# Patient Record
Sex: FEMALE | Race: WHITE | URBAN - METROPOLITAN AREA
[De-identification: names, ages, dates, MRNs, and addresses within clinical notes are randomized per-mention and may not be internally consistent; named-entity substitution may affect disease eponyms.]

---

## 2024-09-04 ENCOUNTER — APPOINTMENT (RX ONLY)
Dept: URBAN - METROPOLITAN AREA CLINIC 36 | Facility: CLINIC | Age: 59
Setting detail: DERMATOLOGY
End: 2024-09-04

## 2024-09-04 ENCOUNTER — APPOINTMENT (RX ONLY)
Dept: URBAN - METROPOLITAN AREA CLINIC 15 | Facility: CLINIC | Age: 59
Setting detail: DERMATOLOGY
End: 2024-09-04

## 2024-09-04 DIAGNOSIS — Z71.89 OTHER SPECIFIED COUNSELING: ICD-10-CM

## 2024-09-04 DIAGNOSIS — L81.4 OTHER MELANIN HYPERPIGMENTATION: ICD-10-CM

## 2024-09-04 DIAGNOSIS — L82.1 OTHER SEBORRHEIC KERATOSIS: ICD-10-CM

## 2024-09-04 DIAGNOSIS — K64.4 RESIDUAL HEMORRHOIDAL SKIN TAGS: ICD-10-CM

## 2024-09-04 DIAGNOSIS — D18.0 HEMANGIOMA: ICD-10-CM

## 2024-09-04 DIAGNOSIS — D22 MELANOCYTIC NEVI: ICD-10-CM

## 2024-09-04 PROBLEM — D18.01 HEMANGIOMA OF SKIN AND SUBCUTANEOUS TISSUE: Status: ACTIVE | Noted: 2024-09-04

## 2024-09-04 PROBLEM — D22.5 MELANOCYTIC NEVI OF TRUNK: Status: ACTIVE | Noted: 2024-09-04

## 2024-09-04 PROCEDURE — ? COUNSELING

## 2024-09-04 PROCEDURE — ? CONSULTATION FOR COSMETIC REMOVAL

## 2024-09-04 PROCEDURE — ? DIAGNOSIS COMMENT

## 2024-09-04 PROCEDURE — 99213 OFFICE O/P EST LOW 20 MIN: CPT

## 2024-09-04 ASSESSMENT — LOCATION SIMPLE DESCRIPTION DERM
LOCATION SIMPLE: PERIANAL SKIN
LOCATION SIMPLE: RIGHT LOWER BACK
LOCATION SIMPLE: RIGHT LOWER BACK
LOCATION SIMPLE: LEFT UPPER BACK
LOCATION SIMPLE: CHEST
LOCATION SIMPLE: LEFT CHEEK
LOCATION SIMPLE: LEFT CHEEK
LOCATION SIMPLE: LEFT UPPER BACK
LOCATION SIMPLE: CHEST

## 2024-09-04 ASSESSMENT — LOCATION ZONE DERM
LOCATION ZONE: ANUS
LOCATION ZONE: TRUNK
LOCATION ZONE: FACE
LOCATION ZONE: FACE
LOCATION ZONE: TRUNK

## 2024-09-04 ASSESSMENT — LOCATION DETAILED DESCRIPTION DERM
LOCATION DETAILED: LEFT INFERIOR UPPER BACK
LOCATION DETAILED: PERIANAL SKIN
LOCATION DETAILED: LEFT INFERIOR UPPER BACK
LOCATION DETAILED: RIGHT MEDIAL INFERIOR CHEST
LOCATION DETAILED: RIGHT MEDIAL INFERIOR CHEST
LOCATION DETAILED: RIGHT SUPERIOR LATERAL MIDBACK
LOCATION DETAILED: LEFT CENTRAL MALAR CHEEK
LOCATION DETAILED: RIGHT SUPERIOR LATERAL MIDBACK
LOCATION DETAILED: LEFT CENTRAL MALAR CHEEK

## 2024-09-04 NOTE — PROCEDURE: DIAGNOSIS COMMENT
Detail Level: Zone
Comment: Pt describes hx of hemorrhoid that developed after delivery of her child but has since resolved, but has excess skin still in area. Recommended calling her OB/Gyn to see if she performs removal of these and if not, requesting referral for colorectal surgeon consultation to discuss further.
Render Risk Assessment In Note?: no

## 2024-10-10 ENCOUNTER — APPOINTMENT (RX ONLY)
Dept: URBAN - METROPOLITAN AREA CLINIC 20 | Facility: CLINIC | Age: 59
Setting detail: DERMATOLOGY
End: 2024-10-10

## 2024-10-10 DIAGNOSIS — Z41.9 ENCOUNTER FOR PROCEDURE FOR PURPOSES OTHER THAN REMEDYING HEALTH STATE, UNSPECIFIED: ICD-10-CM

## 2024-10-10 PROCEDURE — ? BOTOX

## 2024-10-10 NOTE — PROCEDURE: BOTOX
Additional Area 4 Units: 0
Show Lateral Platysmal Band Units: Yes
Consent: Written consent obtained. Risks include but not limited to lid/brow ptosis, bruising, swelling, diplopia, temporary effect, incomplete chemical denervation.
Show Ucl Units: No
Incrementing Botox Units: By 0.5 Units
Additional Area 3 Location: masseter
Detail Level: Detailed
Post-Care Instructions: Patient instructed to not lie down for 4 hours and limit physical activity for 24 hours. Patient instructed not to travel by airplane for 48 hours.
Additional Area 2 Location: chin
Expiration Date (Month Year): 10/2026
Lot #: [id number]
Additional Area 1 Location: upper lip
Dilution (U/0.1 Cc): 0.1

## 2024-10-24 ENCOUNTER — GYNECOLOGY VISIT (OUTPATIENT)
Dept: GYNECOLOGY | Facility: CLINIC | Age: 59
End: 2024-10-24
Payer: COMMERCIAL

## 2024-10-24 ENCOUNTER — TELEPHONE (OUTPATIENT)
Dept: OBGYN | Facility: CLINIC | Age: 59
End: 2024-10-24

## 2024-10-24 ENCOUNTER — HOSPITAL ENCOUNTER (OUTPATIENT)
Facility: MEDICAL CENTER | Age: 59
End: 2024-10-24
Attending: STUDENT IN AN ORGANIZED HEALTH CARE EDUCATION/TRAINING PROGRAM
Payer: COMMERCIAL

## 2024-10-24 VITALS
DIASTOLIC BLOOD PRESSURE: 79 MMHG | WEIGHT: 143.6 LBS | HEART RATE: 76 BPM | BODY MASS INDEX: 21.83 KG/M2 | SYSTOLIC BLOOD PRESSURE: 120 MMHG

## 2024-10-24 DIAGNOSIS — K64.9 HEMORRHOIDS, UNSPECIFIED HEMORRHOID TYPE: ICD-10-CM

## 2024-10-24 DIAGNOSIS — N95.8 GENITOURINARY SYNDROME OF MENOPAUSE: ICD-10-CM

## 2024-10-24 DIAGNOSIS — Z11.3 ROUTINE SCREENING FOR STI (SEXUALLY TRANSMITTED INFECTION): ICD-10-CM

## 2024-10-24 DIAGNOSIS — Z78.0 POSTMENOPAUSAL: ICD-10-CM

## 2024-10-24 PROCEDURE — 87591 N.GONORRHOEAE DNA AMP PROB: CPT

## 2024-10-24 PROCEDURE — 99214 OFFICE O/P EST MOD 30 MIN: CPT | Performed by: STUDENT IN AN ORGANIZED HEALTH CARE EDUCATION/TRAINING PROGRAM

## 2024-10-24 PROCEDURE — 87491 CHLMYD TRACH DNA AMP PROBE: CPT

## 2024-10-24 PROCEDURE — 3074F SYST BP LT 130 MM HG: CPT | Performed by: STUDENT IN AN ORGANIZED HEALTH CARE EDUCATION/TRAINING PROGRAM

## 2024-10-24 PROCEDURE — 3078F DIAST BP <80 MM HG: CPT | Performed by: STUDENT IN AN ORGANIZED HEALTH CARE EDUCATION/TRAINING PROGRAM

## 2024-10-24 RX ORDER — ESTRADIOL 2 MG/1
RING VAGINAL
Qty: 1 EACH | Refills: 3 | Status: SHIPPED | OUTPATIENT
Start: 2024-10-24

## 2024-10-25 LAB
C TRACH DNA SPEC QL NAA+PROBE: NEGATIVE
N GONORRHOEA DNA SPEC QL NAA+PROBE: NEGATIVE
SPECIMEN SOURCE: NORMAL

## 2024-10-29 ENCOUNTER — APPOINTMENT (RX ONLY)
Dept: URBAN - METROPOLITAN AREA CLINIC 20 | Facility: CLINIC | Age: 59
Setting detail: DERMATOLOGY
End: 2024-10-29

## 2024-10-29 DIAGNOSIS — Z41.9 ENCOUNTER FOR PROCEDURE FOR PURPOSES OTHER THAN REMEDYING HEALTH STATE, UNSPECIFIED: ICD-10-CM

## 2024-10-29 PROCEDURE — ? FRAXEL

## 2024-10-29 ASSESSMENT — LOCATION ZONE DERM
LOCATION ZONE: TRUNK
LOCATION ZONE: NECK
LOCATION ZONE: FACE

## 2024-10-29 ASSESSMENT — LOCATION DETAILED DESCRIPTION DERM
LOCATION DETAILED: RIGHT INFERIOR FOREHEAD
LOCATION DETAILED: LEFT INFERIOR ANTERIOR NECK
LOCATION DETAILED: RIGHT INFERIOR LATERAL FOREHEAD
LOCATION DETAILED: LEFT LATERAL FOREHEAD
LOCATION DETAILED: RIGHT INFERIOR MEDIAL MALAR CHEEK
LOCATION DETAILED: RIGHT MEDIAL SUPERIOR CHEST
LOCATION DETAILED: LEFT MEDIAL SUPERIOR CHEST
LOCATION DETAILED: LEFT SUPERIOR MEDIAL FOREHEAD
LOCATION DETAILED: LEFT INFERIOR CENTRAL MALAR CHEEK

## 2024-10-29 ASSESSMENT — LOCATION SIMPLE DESCRIPTION DERM
LOCATION SIMPLE: RIGHT FOREHEAD
LOCATION SIMPLE: LEFT CHEEK
LOCATION SIMPLE: LEFT FOREHEAD
LOCATION SIMPLE: CHEST
LOCATION SIMPLE: LEFT ANTERIOR NECK
LOCATION SIMPLE: RIGHT CHEEK

## 2024-10-29 NOTE — PROCEDURE: FRAXEL
Treatment Level: 6
Total Coverage: 11%
Treatment Level: 1
Number Of Passes: 4
Wavelength: 1927nm
Energy(Mj/Cm2): 20
Location: decolletage of the chest
Indication: photodamage
Location: neck
Total Coverage: 35%
Was An Eye Shield Used?: No
Medium Metal Eye Shield Text: The ocular mucosa was anesthetized with tetracaine. Once adequate anesthesia was optained, medium metal eye shields were inserted and remained in place until the procedure was completed.
Total Coverage: 25%
Location: dorsal forearms
Small Plastic Eye Shield Text: The ocular mucosa was anesthetized with tetracaine. Once adequate anesthesia was optained, small plastic eye shields were inserted and remained in place until the procedure was completed.
Consent: Written consent obtained, risks reviewed including but not limited to pain and incomplete improvement.
Medium Plastic Eye Shield Text: The ocular mucosa was anesthetized with tetracaine. Once adequate anesthesia was optained, medium plastic eye shields were inserted and remained in place until the procedure was completed.
Energy(Mj/Cm2): 40
Small Metal Eye Shield Text: The ocular mucosa was anesthetized with tetracaine. Once adequate anesthesia was optained, small metal eye shields were inserted and remained in place until the procedure was completed.
External Cooling: Satnam Cryo 5
Large Plastic Eye Shield Text: The ocular mucosa was anesthetized with tetracaine. Once adequate anesthesia was optained, large plastic eye shields were inserted and remained in place until the procedure was completed.
Location: left dorsal hand
Post-Care Instructions: I reviewed with the patient in detail post-care instructions. Patient should avoid sun until area fully healed.
Price (Use Numbers Only, No Special Characters Or $): 630.00
Detail Level: Simple
Tip: 15mm
Large Metal Eye Shield Text: The ocular mucosa was anesthetized with tetracaine. Once adequate anesthesia was optained, large metal eye shields were inserted and remained in place until the procedure was completed.
Location: full face

## 2024-10-31 ENCOUNTER — HOSPITAL ENCOUNTER (OUTPATIENT)
Dept: LAB | Facility: MEDICAL CENTER | Age: 59
End: 2024-10-31
Attending: STUDENT IN AN ORGANIZED HEALTH CARE EDUCATION/TRAINING PROGRAM
Payer: COMMERCIAL

## 2024-10-31 DIAGNOSIS — Z11.3 ROUTINE SCREENING FOR STI (SEXUALLY TRANSMITTED INFECTION): ICD-10-CM

## 2024-10-31 LAB
HCV AB SER QL: NORMAL
HIV 1+2 AB+HIV1 P24 AG SERPL QL IA: NORMAL

## 2024-10-31 PROCEDURE — 36415 COLL VENOUS BLD VENIPUNCTURE: CPT

## 2024-10-31 PROCEDURE — 86803 HEPATITIS C AB TEST: CPT

## 2024-10-31 PROCEDURE — 87389 HIV-1 AG W/HIV-1&-2 AB AG IA: CPT

## 2024-10-31 PROCEDURE — 86780 TREPONEMA PALLIDUM: CPT

## 2024-11-01 LAB — T PALLIDUM AB SER QL IA: NONREACTIVE

## 2024-11-06 ENCOUNTER — APPOINTMENT (RX ONLY)
Dept: URBAN - METROPOLITAN AREA CLINIC 15 | Facility: CLINIC | Age: 59
Setting detail: DERMATOLOGY
End: 2024-11-06

## 2024-11-06 DIAGNOSIS — L91.8 OTHER HYPERTROPHIC DISORDERS OF THE SKIN: ICD-10-CM

## 2024-11-06 DIAGNOSIS — D22 MELANOCYTIC NEVI: ICD-10-CM

## 2024-11-06 PROBLEM — D22.4 MELANOCYTIC NEVI OF SCALP AND NECK: Status: ACTIVE | Noted: 2024-11-06

## 2024-11-06 PROBLEM — D22.5 MELANOCYTIC NEVI OF TRUNK: Status: ACTIVE | Noted: 2024-11-06

## 2024-11-06 PROCEDURE — ? BIOPSY BY SHAVE METHOD (COSMETIC)

## 2024-11-06 PROCEDURE — ? DIAGNOSIS COMMENT

## 2024-11-06 PROCEDURE — ? CONSULTATION FOR COSMETIC REMOVAL

## 2024-11-06 ASSESSMENT — LOCATION SIMPLE DESCRIPTION DERM
LOCATION SIMPLE: TRAPEZIAL NECK
LOCATION SIMPLE: ABDOMEN
LOCATION SIMPLE: LEFT BREAST
LOCATION SIMPLE: LEFT ANTERIOR NECK

## 2024-11-06 ASSESSMENT — LOCATION DETAILED DESCRIPTION DERM
LOCATION DETAILED: LEFT MEDIAL BREAST 8-9:00 REGION
LOCATION DETAILED: LEFT INFERIOR LATERAL NECK
LOCATION DETAILED: MID TRAPEZIAL NECK
LOCATION DETAILED: RIGHT LATERAL ABDOMEN

## 2024-11-06 ASSESSMENT — LOCATION ZONE DERM
LOCATION ZONE: NECK
LOCATION ZONE: TRUNK
LOCATION ZONE: NECK

## 2024-11-06 NOTE — PROCEDURE: BIOPSY BY SHAVE METHOD (COSMETIC)
Detail Level: Simple
Biopsy Type: H and E
Biopsy Method: Dermablade
Anesthesia Type: 1% lidocaine with epinephrine and a 1:10 solution of 8.4% sodium bicarbonate
Anesthesia Volume In Cc: 1
Hemostasis: Electrocautery
Wound Care: Petrolatum
Size Of Lesion In Cm (Optional): 0.5
X Size Of Lesion In Cm (Optional): 0
Price (Use Numbers Only, No Special Characters Or $): 802.67
Lab: [id number]
Lab Facility: 
Path Notes (To The Dermatopathologist): Cosmetic shave removal - cash pay
Render Path Notes In Note?: No
Consent: Written consent was obtained and risks were reviewed including but not limited to scarring, infection, bleeding, scabbing, incomplete removal, nerve damage and allergy to anesthesia.
Post-Care Instructions: I reviewed with the patient in detail post-care instructions. Patient is to keep the biopsy site dry overnight, and then apply Vaseline daily until healed.
Notification Instructions: Patient will be notified of biopsy results. However, patient instructed to call the office if not contacted within 2 weeks.
Billing Type: Patient Bill
Size Of Lesion In Cm (Optional): 0.6

## 2024-11-06 NOTE — PROCEDURE: DIAGNOSIS COMMENT
Detail Level: Zone
Render Risk Assessment In Note?: no
Comment: Snip removal performed as a courtesy today.

## 2024-11-07 RX ORDER — ESTRADIOL 10 UG/1
INSERT VAGINAL
Qty: 24 TABLET | Refills: 0 | Status: SHIPPED | OUTPATIENT
Start: 2024-11-07

## 2024-11-18 ENCOUNTER — APPOINTMENT (RX ONLY)
Dept: URBAN - METROPOLITAN AREA CLINIC 20 | Facility: CLINIC | Age: 59
Setting detail: DERMATOLOGY
End: 2024-11-18

## 2024-11-18 DIAGNOSIS — Z41.9 ENCOUNTER FOR PROCEDURE FOR PURPOSES OTHER THAN REMEDYING HEALTH STATE, UNSPECIFIED: ICD-10-CM

## 2024-11-18 PROCEDURE — ? LASER HAIR REMOVAL

## 2024-11-18 NOTE — PROCEDURE: LASER HAIR REMOVAL
Pulse Duration (Include Units): 03
Were Eye Shields Employed?: No
Laser Type: diode 810nm
Pulse Duration (Include Units): 2 passes
Price (Use Numbers Only, No Special Characters Or $): 158
Total Pulses: 60ms 2 passes
Treatment Number: 0
Treatment Number: 1
Fluence (Will Not Render If 0): 20
Pre-Procedure: Prior to proceeding the treatment areas were cleaned and all present put on their eye protection.
Consent: Written consent obtained, risks reviewed including but not limited to crusting, scabbing, blistering, scarring, darker or lighter pigmentary change, paradoxical hair regrowth, incomplete removal of hair and infection.
Eye Shield Text: Given the treatment area eye shields were inserted prior to treatment.
Shaving (Optional): The patient shaved at home
Number Of Prepaid Treatments (Will Not Render If 0): 4
Detail Level: Zone
Post-Care Instructions: I reviewed with the patient in detail post-care instructions. Patient should avoid sun for a minimum of 4 weeks before and after treatment.
Fluence (Will Not Render If 0): 15
Post-Procedure Care: Immediate endpoint: perifollicular erythema and edema. Post care reviewed with patient.
Tolerated Procedure (Optional): Tolerated Well
Fluence (Will Not Render If 0): 12

## 2024-12-23 ENCOUNTER — APPOINTMENT (OUTPATIENT)
Dept: URBAN - METROPOLITAN AREA CLINIC 20 | Facility: CLINIC | Age: 59
Setting detail: DERMATOLOGY
End: 2024-12-23

## 2024-12-23 DIAGNOSIS — Z41.9 ENCOUNTER FOR PROCEDURE FOR PURPOSES OTHER THAN REMEDYING HEALTH STATE, UNSPECIFIED: ICD-10-CM

## 2024-12-23 PROCEDURE — ? LASER HAIR REMOVAL

## 2025-01-09 DIAGNOSIS — Z12.31 ENCOUNTER FOR SCREENING MAMMOGRAM FOR MALIGNANT NEOPLASM OF BREAST: ICD-10-CM

## 2025-01-09 DIAGNOSIS — Z00.00 ANNUAL PHYSICAL EXAM: ICD-10-CM

## 2025-01-09 DIAGNOSIS — N63.0 MASS OF BREAST, UNSPECIFIED LATERALITY: ICD-10-CM

## 2025-01-14 RX ORDER — ESTRADIOL 10 UG/1
INSERT VAGINAL
Qty: 24 TABLET | Refills: 3 | Status: SHIPPED | OUTPATIENT
Start: 2025-01-14

## 2025-01-14 NOTE — TELEPHONE ENCOUNTER
Received request via: Pharmacy    Was the patient seen in the last year in this department? Yes    Does the patient have an active prescription (recently filled or refills available) for medication(s) requested? No    Pharmacy Name: [facility]    Does the patient have care home Plus and need 100-day supply? (This applies to ALL medications) Patient does not have SCP

## 2025-01-27 ENCOUNTER — APPOINTMENT (OUTPATIENT)
Dept: URBAN - METROPOLITAN AREA CLINIC 20 | Facility: CLINIC | Age: 60
Setting detail: DERMATOLOGY
End: 2025-01-27

## 2025-01-27 ENCOUNTER — HOSPITAL ENCOUNTER (OUTPATIENT)
Dept: RADIOLOGY | Facility: MEDICAL CENTER | Age: 60
End: 2025-01-27
Attending: STUDENT IN AN ORGANIZED HEALTH CARE EDUCATION/TRAINING PROGRAM
Payer: COMMERCIAL

## 2025-01-27 DIAGNOSIS — Z41.9 ENCOUNTER FOR PROCEDURE FOR PURPOSES OTHER THAN REMEDYING HEALTH STATE, UNSPECIFIED: ICD-10-CM

## 2025-01-27 DIAGNOSIS — N63.0 MASS OF BREAST, UNSPECIFIED LATERALITY: ICD-10-CM

## 2025-01-27 PROCEDURE — 76642 ULTRASOUND BREAST LIMITED: CPT | Mod: RT

## 2025-01-27 PROCEDURE — G0279 TOMOSYNTHESIS, MAMMO: HCPCS

## 2025-01-27 PROCEDURE — ? LASER HAIR REMOVAL

## 2025-01-28 ENCOUNTER — TELEPHONE (OUTPATIENT)
Dept: HEALTH INFORMATION MANAGEMENT | Facility: OTHER | Age: 60
End: 2025-01-28
Payer: COMMERCIAL

## 2025-01-31 ENCOUNTER — HOSPITAL ENCOUNTER (OUTPATIENT)
Dept: RADIOLOGY | Facility: MEDICAL CENTER | Age: 60
End: 2025-01-31
Attending: STUDENT IN AN ORGANIZED HEALTH CARE EDUCATION/TRAINING PROGRAM
Payer: COMMERCIAL

## 2025-01-31 DIAGNOSIS — R92.8 ABNORMAL FINDINGS ON DIAGNOSTIC IMAGING OF BREAST: ICD-10-CM

## 2025-01-31 LAB — PATHOLOGY CONSULT NOTE: NORMAL

## 2025-01-31 PROCEDURE — 88305 TISSUE EXAM BY PATHOLOGIST: CPT | Performed by: PATHOLOGY

## 2025-01-31 PROCEDURE — 77065 DX MAMMO INCL CAD UNI: CPT

## 2025-01-31 PROCEDURE — 88305 TISSUE EXAM BY PATHOLOGIST: CPT | Mod: 26 | Performed by: PATHOLOGY

## 2025-01-31 PROCEDURE — 88360 TUMOR IMMUNOHISTOCHEM/MANUAL: CPT | Mod: 91 | Performed by: PATHOLOGY

## 2025-01-31 PROCEDURE — 19083 BX BREAST 1ST LESION US IMAG: CPT | Mod: RT

## 2025-01-31 PROCEDURE — 88360 TUMOR IMMUNOHISTOCHEM/MANUAL: CPT | Mod: 26 | Performed by: PATHOLOGY

## 2025-02-03 ENCOUNTER — TELEPHONE (OUTPATIENT)
Dept: RADIOLOGY | Facility: MEDICAL CENTER | Age: 60
End: 2025-02-03
Payer: COMMERCIAL

## 2025-02-03 DIAGNOSIS — C50.911 INVASIVE DUCTAL CARCINOMA OF RIGHT BREAST (HCC): ICD-10-CM

## 2025-02-14 DIAGNOSIS — C50.911 INVASIVE DUCTAL CARCINOMA OF RIGHT BREAST (HCC): ICD-10-CM

## 2025-02-18 PROBLEM — E03.9 HYPOTHYROIDISM: Status: ACTIVE | Noted: 2023-07-05

## 2025-02-18 PROBLEM — C50.611 MALIGNANT NEOPLASM OF AXILLARY TAIL OF RIGHT BREAST IN FEMALE, ESTROGEN RECEPTOR POSITIVE (HCC): Status: ACTIVE | Noted: 2025-02-18

## 2025-02-18 PROBLEM — A69.20 LYME DISEASE: Status: ACTIVE | Noted: 2024-11-11

## 2025-02-18 PROBLEM — N60.12 FIBROCYSTIC CHANGES OF LEFT BREAST: Status: ACTIVE | Noted: 2023-07-05

## 2025-02-18 PROBLEM — Z17.0 MALIGNANT NEOPLASM OF AXILLARY TAIL OF RIGHT BREAST IN FEMALE, ESTROGEN RECEPTOR POSITIVE (HCC): Status: ACTIVE | Noted: 2025-02-18

## 2025-02-18 RX ORDER — CEPHALEXIN 500 MG/1
500 CAPSULE ORAL 4 TIMES DAILY
COMMUNITY
End: 2025-02-26

## 2025-02-18 RX ORDER — LORAZEPAM 1 MG/1
TABLET ORAL
COMMUNITY
End: 2025-02-26

## 2025-02-18 RX ORDER — ONDANSETRON 8 MG/1
TABLET, ORALLY DISINTEGRATING ORAL
COMMUNITY
End: 2025-02-26

## 2025-02-18 RX ORDER — PROMETHAZINE HYDROCHLORIDE 12.5 MG/1
SUPPOSITORY RECTAL
COMMUNITY
End: 2025-02-26

## 2025-02-18 RX ORDER — ESTRADIOL 0.05 MG/D
PATCH, EXTENDED RELEASE TRANSDERMAL
COMMUNITY
End: 2025-02-26

## 2025-02-18 RX ORDER — ESTRADIOL 0.07 MG/D
PATCH, EXTENDED RELEASE TRANSDERMAL
COMMUNITY

## 2025-02-18 RX ORDER — PROGESTERONE 200 MG/1
CAPSULE ORAL
COMMUNITY

## 2025-02-18 NOTE — Clinical Note
REFERRAL APPROVAL NOTICE         Sent on February 17, 2025                   Suzy Vazquez  [address]  [place] [address]                   Dear Ms. Vazquez,    After a careful review of the medical information and benefit coverage, [facility] has processed your referral. See below for additional details.    If applicable, you must be actively enrolled with your insurance for coverage of the authorized service. If you have any questions regarding your coverage, please contact your insurance directly.    REFERRAL INFORMATION   Referral #:  [id number]  Referred-To Department    Referred-By Provider:  Surgery    Matthew Bang D.O.   Breast Surgery [facility]      [address]  [place] [address]  [phone] [address]  [place] [id number] [phone]    Referral Start Date:  02/14/2025  Referral End Date:   02/14/2026             SCHEDULING  If you do not already have an appointment, please call [phone] to make an appointment.     MORE INFORMATION  If you do not already have a [employer] account, sign up at: [email or web address]  You can access your medical information, make appointments, see lab results, billing information, and more.  If you have questions regarding this referral, please contact  the [facility] Referrals department at:             [phone]. Monday - Friday 8:00AM - 5:00PM.     Sincerely,    [facility]

## 2025-02-19 ENCOUNTER — PATIENT MESSAGE (OUTPATIENT)
Dept: SURGERY | Facility: MEDICAL CENTER | Age: 60
End: 2025-02-19

## 2025-02-19 ENCOUNTER — OFFICE VISIT (OUTPATIENT)
Dept: SURGERY | Facility: MEDICAL CENTER | Age: 60
End: 2025-02-19
Payer: COMMERCIAL

## 2025-02-19 VITALS
SYSTOLIC BLOOD PRESSURE: 114 MMHG | DIASTOLIC BLOOD PRESSURE: 90 MMHG | HEIGHT: 68 IN | BODY MASS INDEX: 20.46 KG/M2 | TEMPERATURE: 97.3 F | OXYGEN SATURATION: 95 % | HEART RATE: 79 BPM | WEIGHT: 135 LBS

## 2025-02-19 DIAGNOSIS — C50.611 MALIGNANT NEOPLASM OF AXILLARY TAIL OF RIGHT BREAST IN FEMALE, ESTROGEN RECEPTOR POSITIVE (HCC): ICD-10-CM

## 2025-02-19 DIAGNOSIS — N60.12 FIBROCYSTIC CHANGES OF LEFT BREAST: ICD-10-CM

## 2025-02-19 DIAGNOSIS — Z17.0 MALIGNANT NEOPLASM OF AXILLARY TAIL OF RIGHT BREAST IN FEMALE, ESTROGEN RECEPTOR POSITIVE (HCC): ICD-10-CM

## 2025-02-19 PROCEDURE — 99205 OFFICE O/P NEW HI 60 MIN: CPT | Performed by: SURGERY

## 2025-02-19 PROCEDURE — 99417 PROLNG OP E/M EACH 15 MIN: CPT | Performed by: SURGERY

## 2025-02-19 PROCEDURE — 3074F SYST BP LT 130 MM HG: CPT | Performed by: SURGERY

## 2025-02-19 PROCEDURE — 3080F DIAST BP >= 90 MM HG: CPT | Performed by: SURGERY

## 2025-02-19 ASSESSMENT — ENCOUNTER SYMPTOMS
DEPRESSION: 1
SLEEP DISTURBANCE: 1
NERVOUS/ANXIOUS: 1

## 2025-02-19 NOTE — PROGRESS NOTES
"    Subjective     Suzy Vazquez is a 59 y.o. female who presents for evaluation of a new diagnosis of right breast cancer.  She reports that she noticed the lump in the right axillary tail within the last couple of months.  She is not sure whether it's changed in that time.  It is not painful, and there are no other associated symptoms.    Routine self breast exams: Yes  Breast pain: No   Nipple discharge: No   Skin changes: No   Masses: Yes  Contour/nipple changes: No   Previous breast biopsy or surgery: Yes  - Left breast 03:00 periareolar US-guided CNBx 2023: Dense stroma with cystic changes.     - Concordant. SecureMark marker.  The patient reports \"many\" biopsies in the past, but I don't have documentation beyond the  and current biopsies.    Age at menarche: unknown  Age at menopause: 55  Age at first birth: 29    Hormone replacement therapy: Yes (ongoing combined HRT including testosterone)    Family history of cancer: Mother breast cancer age 59.  Father prostate cancer age 50, brother prostate cancer age 58.  - The patient underwent [employer] testing in late  that was negative for deleterious mutations in BRCA1/2 and Garcia syndrome genes.  She reports that she thought she had seen a positive result but cannot recall the name of the positive gene mutation; she will try to find this.    Lifetime (5-yr) breast cancer risk calculations  Tyrer-Cuzick v7: 19.5% (4.99%)  Tyrer-Cuzick v8: 22.44% (5.92%)  Marya model: 18.92% (3.72%)    Imaging  - Bilateral diagnostic mammogram ([facility]) 2025: Right focally increased density at palpable site on true lateral view only. BIRADS 4c, density C.  - Right limited ultrasound ([facility]) 2025: Right 10:00 12cmFN 0.8cm heterogeneous hypoechoic ill-defined mass with echogenic rim. Axilla without adenopathy. BIRADS 4c.   All imaging personally reviewed (internal and external images).    Pathology  Right breast US-guided CNBx " Diagnoses and all orders for this visit:  Viral URI  Sore throat  -     POCT rapid strep A manually resulted  -     Group A Streptococcus, PCR; Future  ; allow 24* for results  Plenty of fluids.  Rest.  Tylenol every 6 hours as needed for any discomforts.  Motrin every 6 hours as needed for any discomforts.  Follow up if symptoms are not beginning to improve after 7-10 days.  Follow up with any new concerns or questions.     01/31/2025: Invasive ductal carcinoma, grade 1, ER+ (>90%) VT+ (>90%) HER2 negative (0 by IHC), Ki67 10%.  - Concordant. HydroMARK open coil.    Past Medical History   Past Medical History:   Diagnosis Date    Allergy     Anxiety     Asthma     Lyme disease     Urinary tract infection        Surgical History  Past Surgical History:   Procedure Laterality Date    DILATION AND CURETTAGE      PRIMARY C SECTION      US-NEEDLE CORE BX-BREAST PANEL      The cyst popped when biopsy attempted       Family History  Family History   Problem Relation Age of Onset    Breast Cancer Mother 59    Cancer Mother         Breadt cancer    Prostate cancer Father 50    Cancer Father         Prostate    Prostate cancer Brother 58       Social History  Social History     Socioeconomic History    Marital status: Single     Spouse name: Not on file    Number of children: Not on file    Years of education: Not on file    Highest education level: Not on file   Occupational History    Not on file   Tobacco Use    Smoking status: Never    Smokeless tobacco: Never   Vaping Use    Vaping status: Never Used   Substance and Sexual Activity    Alcohol use: Not Currently    Drug use: Never    Sexual activity: Yes   Other Topics Concern    Not on file   Social History Narrative    Not on file     Social Drivers of Health     Financial Resource Strain: Not on file   Food Insecurity: Not on file   Transportation Needs: Not on file   Physical Activity: Not on file   Stress: Not on file   Social Connections: Not on file   Intimate Partner Violence: Not on file   Housing Stability: Not on file     Review of Systems  Review of Systems   Psychiatric/Behavioral:  Positive for depression and sleep disturbance. The patient is nervous/anxious.    All other systems reviewed and are negative.       Objective   BP (!) 114/90 (BP Location: Left arm, Patient Position: Sitting, BP Cuff Size: Large adult)   Pulse 79   Temp 36.3 °C (97.3 °F) (Temporal)   Ht 1.727 m  "(5' 8\")   Wt 61.2 kg (135 lb)   SpO2 95%   BMI 20.53 kg/m²    Physical Exam  Vitals and nursing note reviewed.   Constitutional:       General: She is not in acute distress.     Appearance: Normal appearance.   HENT:      Head: Normocephalic and atraumatic.      Right Ear: External ear normal.      Left Ear: External ear normal.      Nose: Nose normal.      Mouth/Throat:      Pharynx: Oropharynx is clear.   Eyes:      General: No scleral icterus.     Conjunctiva/sclera: Conjunctivae normal.   Cardiovascular:      Rate and Rhythm: Normal rate and regular rhythm.      Heart sounds: Normal heart sounds. No murmur heard.     No friction rub. No gallop.   Pulmonary:      Effort: Pulmonary effort is normal. No respiratory distress.      Breath sounds: Normal breath sounds. No wheezing, rhonchi or rales.   Chest:   Breasts:     Nick Score is 5.      Breasts are symmetrical.      Right: Mass present. No swelling, bleeding, inverted nipple, nipple discharge or skin change.      Left: Normal. No swelling, bleeding, inverted nipple, mass, nipple discharge or skin change.          Comments: Bilateral breasts examined in the upright and supine positions.  No suspicious skin changes (erythema, peau d'orange).  No unexpected contour abnormalities.  Bilateral breast tissue very dense and nodular with dominant firm nodule in the right axillary tail spanning ~0.5cm.  Bilateral nipples everted without expressible discharge.  No palpable cervical, supraclavicular, or axillary adenopathy bilaterally.  Bedside ultrasound performed with the GE 12mHz linear probe confirming the small residual hypoechoic mass in the right axillary tail with associated HydroMARK, with no visibly abnormal adenopathy.  Abdominal:      General: Abdomen is flat. There is no distension.      Palpations: Abdomen is soft. There is no mass.   Musculoskeletal:         General: No swelling or deformity. Normal range of motion.      Cervical back: Neck supple. "   Lymphadenopathy:      Cervical: No cervical adenopathy.      Upper Body:      Right upper body: No supraclavicular or axillary adenopathy.      Left upper body: No supraclavicular or axillary adenopathy.   Skin:     General: Skin is warm and dry.      Capillary Refill: Capillary refill takes less than 2 seconds.   Neurological:      General: No focal deficit present.      Mental Status: She is alert and oriented to person, place, and time.   Psychiatric:         Mood and Affect: Mood normal.         Behavior: Behavior normal.         Thought Content: Thought content normal.         Judgment: Judgment normal.     [facility] ECOG Performance Status categories: 0= Fully active, able to carry on all pre-disease performance without restriction.  FUNCTIONAL ASSESSMENT  Patient responses to questions:  - Have you ever had shoulder surgery or been treated for a shoulder injury that resulted in a loss of range of motion?  No   - Are you unable to reach into an upper cabinet and retrieve a cup or plate?  No   - Do you have any limitations in daily activities because of your shoulder?  No   Overhead raise test for shoulder flexion:  Normal Range:  150-180 degrees    Assessment & Plan   The patient is a delightful 59 y.o. female with a new diagnosis of right breast axillary tail invasive ductal carcinoma.  This is clinical stage cT1b N0 M0 (anatomic/prognostic stage IA), spanning 0.8 cm by ultrasound, with clinically negative axillary lymph nodes.  The tumor is grade 1, estrogen receptor positive (>90 %), progesterone receptor positive (>?90 %), HER2 negative (0 by IHC), with a Ki67 of 10 %.  The cancer type, staging, and biomarkers were all explained in detail to the patient.    We discussed the development, progression, and natural history of untreated breast cancer, including the potential for progression to metastatic disease and death.  Given her current stage of IA I think that this tumor is very treatable and her prognosis is  very good.  We discussed national standards and guidelines for treatment of breast cancer, and specified that we follow NCCN guidelines for multidisciplinary breast cancer treatment.  I discussed medical oncology and radiation oncology treatments briefly, and how I expect them to be applied in her particular case.  She is currently on combined hormone replacement therapy, and I strongly encouraged her to stop her systemic HRT.  Vaginal estrogen cream has been shown to have minimal systemic absorption and can significantly improve vaginal symptoms of menopause.  She is very concerned about her menopausal symptoms, and I have placed a referral to [facility] to discuss nonhormonal treatment options.    We discussed surgical options, including mastectomy with or without reconstruction and partial mastectomy (lumpectomy).  We discussed that there is no difference in survival between mastectomy and lumpectomy, but that there is an increased risk of local recurrence with lumpectomy for which we give radiation after surgery.  We discussed that breast conservation and radiation may confer a small survival benefit according to new data.  We discussed lymphatic sampling and the indications for sentinel lymph node biopsy, axillary dissection, and scenarios in which lymphatic sampling may be omitted.  In her particular case, I recommend proceeding with breast conservation and sentinel node sampling.   All questions answered in detail.  A thorough discussion was held with the patient of the indications, alternatives, risks (including lymphedema, positive margins, bleeding, infection, anesthetic complications, and the potential need for more than one operation), as well as the expected outcomes.  After this discussion with shared decision-making, the patient would like to proceed with surgery scheduling for right partial mastectomy, right axillary sentinel lymph node biopsy, possible axillary dissection.      Before  "proceeding with surgery, she will need bilateral breast MRI to confirm the extent of disease and assess for other areas of concern preoperatively.    We discussed the option of genetic counseling and genetic testing.  Given the possibly positive prior result she reports, I recommend formal genetic counseling and panel testing.  Referral placed to [facility].    We discussed that there is a significant survival benefit to dedicated exercise throughout the treatment of breast cancer and she was given information on community exercise programs that focus on cancer survivorship.    The patient has the following potential barriers to care:  No identified barriers today.    We discussed postoperative pain and that we will utilize a multimodal approach to pain control that may include preoperative medications given before surgery, intraoperative nerve blocks and local anesthetic, nonopiate pain medications during and after surgery, and postoperative nonopiate pain medications including antiinflammatory medication and acetaminophen.  If these measures are inadequate to control her pain, opiate medications may be used on a short-term basis to aid in postoperative recovery and mobilization.  The patient does not have a history of chronic opiate use or substance abuse and has been educated about avoiding use of controlled substances with alcohol, marijuana, and/or illicit substances.  A drug utilization report will be fully reviewed prior to providing a prescription for any controlled substance if that prescription is deemed necessary.     A total of 95 minutes were spent on and with this patient today, including review of records, independent review of imaging, history and physical exam, counseling, documentation of exam, and coordination of care.  The patient was given a copy of her percutaneous biopsy pathology report, a filled-out \"Your Guide to Understanding the Diagnosis of Breast Cancer\" booklet adapted from the " [email or web address].org version, and a personalized flight plan including her diagnosis, imaging, and detailed plan.  A referral was placed to cancer navigation services.      Problem   Malignant Neoplasm of Axillary Tail of Right Breast in Female, Estrogen Receptor Positive (Hcc)    Right axillary tail IDC, G1, ER+MS+HER2-, Ki67 10%, [id number] (jak/prog IA)  - US-guided CNBx 01/31/2025  - MRI pending  - Planned right partial mastectomy, SLNBx ([facility])  - Planned postop referrals to med onc, rad onc     Lyme Disease   Hypothyroidism    Patient was started on levothyroxine 25mcg and liothyronine 5mcg by a functional medicine doctor      Fibrocystic Changes of Left Breast    Left breast 03:00 periareolar dense stroma with cystic changes.  - US-guided CNBx 04/03/2023 (concordant, SecureMark marker)       Cancer Staging   Malignant neoplasm of axillary tail of right breast in female, estrogen receptor positive (HCC)  Staging form: Breast, AJCC 8th Edition  - Clinical stage from 2/18/2025: Stage IA (cT1b, cN0, cM0, G1, ER+, MS+, HER2-) - Signed by Christi Lopez M.D. on 2/18/2025

## 2025-02-21 ENCOUNTER — TELEPHONE (OUTPATIENT)
Dept: SURGERY | Facility: MEDICAL CENTER | Age: 60
End: 2025-02-21
Payer: COMMERCIAL

## 2025-02-21 ENCOUNTER — APPOINTMENT (OUTPATIENT)
Dept: ADMISSIONS | Facility: MEDICAL CENTER | Age: 60
End: 2025-02-21
Attending: SURGERY
Payer: COMMERCIAL

## 2025-02-21 NOTE — TELEPHONE ENCOUNTER
Contacted patient after recent appointment with Dr. Diaz. Discussed that she will have a preop appt on 03/10/2025 at 1:20pm to answer any postop/surgical expectation questions that she may have. Encouraged attendance to the newly diagnosed breast cancer class and lymphedema education class. Patient expressed interest in participating in the EXAI trial. Encouraged to call back with any questions or concerns.

## 2025-02-21 NOTE — Clinical Note
REFERRAL APPROVAL NOTICE         Sent on February 20, 2025                   Suzy Vazquez  [address]  [place] [address]                   Dear Ms. Vazquez,    After a careful review of the medical information and benefit coverage, [facility] has processed your referral. See below for additional details.    If applicable, you must be actively enrolled with your insurance for coverage of the authorized service. If you have any questions regarding your coverage, please contact your insurance directly.    REFERRAL INFORMATION   Referral #:  [id number]  Referred-To Department    Referred-By Provider:  Surgery    Christi Lopez M.D.   Breast Surgery [facility]      [address]  [address]  [place] [address]  [phone] [address]  [place] [address] [phone]    Referral Start Date:  02/19/2025  Referral End Date:   02/19/2026             SCHEDULING  If you do not already have an appointment, please call [phone] to make an appointment.     MORE INFORMATION  If you do not already have a [employer] account, sign up at: [email or web address]  You can access your medical information, make appointments, see lab results, billing information, and more.  If you have questions regarding this referral, please contact  the [facility] Referrals department at:             [phone]. Monday - Friday 8:00AM - 5:00PM.     Sincerely,    [facility]

## 2025-02-25 ENCOUNTER — NON-PROVIDER VISIT (OUTPATIENT)
Dept: GENETICS | Facility: MEDICAL CENTER | Age: 60
End: 2025-02-25
Payer: COMMERCIAL

## 2025-02-25 ENCOUNTER — TELEPHONE (OUTPATIENT)
Dept: SURGERY | Facility: MEDICAL CENTER | Age: 60
End: 2025-02-25

## 2025-02-25 ENCOUNTER — APPOINTMENT (OUTPATIENT)
Dept: HEMATOLOGY ONCOLOGY | Facility: MEDICAL CENTER | Age: 60
End: 2025-02-25
Attending: STUDENT IN AN ORGANIZED HEALTH CARE EDUCATION/TRAINING PROGRAM
Payer: COMMERCIAL

## 2025-02-25 NOTE — PROGRESS NOTES
Suzy Vazquez is a 59 y.o. female here for a non-provider visit for: Lab Draws  on 2/25/2025 at 7:46 AM    Procedure Performed: Venipuncture     Anatomical site: Left Antecubital Area (AC)    Equipment used: 25 butterfly    Labs drawn: [employer] (two lavender EDTA tubes)    Ordering Provider: [employer]    [place] By: Tamra Hardy, Med Ass't

## 2025-02-26 ENCOUNTER — PATIENT OUTREACH (OUTPATIENT)
Dept: ONCOLOGY | Facility: MEDICAL CENTER | Age: 60
End: 2025-02-26
Payer: COMMERCIAL

## 2025-02-26 ENCOUNTER — APPOINTMENT (OUTPATIENT)
Dept: RADIOLOGY | Facility: MEDICAL CENTER | Age: 60
End: 2025-02-26
Attending: SURGERY
Payer: COMMERCIAL

## 2025-02-26 ENCOUNTER — RESULTS FOLLOW-UP (OUTPATIENT)
Dept: SURGERY | Facility: MEDICAL CENTER | Age: 60
End: 2025-02-26

## 2025-02-26 DIAGNOSIS — Z17.0 MALIGNANT NEOPLASM OF AXILLARY TAIL OF RIGHT BREAST IN FEMALE, ESTROGEN RECEPTOR POSITIVE (HCC): ICD-10-CM

## 2025-02-26 DIAGNOSIS — R92.8 ABNORMAL FINDINGS ON DIAGNOSTIC IMAGING OF BREAST: ICD-10-CM

## 2025-02-26 DIAGNOSIS — C50.611 MALIGNANT NEOPLASM OF AXILLARY TAIL OF RIGHT BREAST IN FEMALE, ESTROGEN RECEPTOR POSITIVE (HCC): ICD-10-CM

## 2025-02-26 PROCEDURE — 700117 HCHG RX CONTRAST REV CODE 255: Mod: JZ | Performed by: SURGERY

## 2025-02-26 PROCEDURE — A9579 GAD-BASE MR CONTRAST NOS,1ML: HCPCS | Mod: JZ | Performed by: SURGERY

## 2025-02-26 PROCEDURE — 77049 MRI BREAST C-+ W/CAD BI: CPT

## 2025-02-26 RX ADMIN — GADOTERIDOL 20 ML: 279.3 INJECTION, SOLUTION INTRAVENOUS at 13:41

## 2025-02-26 NOTE — PROGRESS NOTES
Oncology Nurse Navigation  Pt scheduled for partial mastectomy with Dr. Diaz on 3/12/25.  Phoned for follow up.  No answer, left message.   Intro letter sent via My Chart.    You will be called for a colonoscopy.     Labs today: cholesterol, blood sugar.    Call to schedule mammogram.    Use Valtrex as needed for cold sores.    Get shingles vaccine when convenient.    Keep an eye on stomach symptoms -- may be a lactose/dairy mild intolerance (gluten is also a common culprit) but could also be irritable bowel syndrome. If not getting better, come back and we can talk about testing and/or treatment options. Can try peppermint oil too!

## 2025-02-26 NOTE — PROGRESS NOTES
Incoming message from pt via My Chart    Suzy Vazquez R.N.  Phone Number: [phone]     Thank you! So far, I have done the genetic testing blood test and currently getting my MRI.

## 2025-02-26 NOTE — TELEPHONE ENCOUNTER
Received referral for appointment with Dr. Vera Farris. I left a voicemail for the patient to call back to schedule an appointment.   
no

## 2025-02-27 ENCOUNTER — PATIENT OUTREACH (OUTPATIENT)
Dept: ONCOLOGY | Facility: MEDICAL CENTER | Age: 60
End: 2025-02-27
Payer: COMMERCIAL

## 2025-02-27 NOTE — PROGRESS NOTES
"On February 27, 2025, Oncology Social Worker Sadie Ernandez contacted pt. via telephone to follow up on psychosocial distress screening.  OSW Awais introduced herself, role and reason for call.  Pt. shared she was \"doing good and waiting for appointments for different people.  I need an ultrasound.\"  Pt. shared she was currently on vacation and thanked OSRADHA Ernandez for her call.  OSRADHA Ernandez encouraged pt. to reach out in the future if she has any questions and/or needs support.    "

## 2025-03-04 ENCOUNTER — PRE-ADMISSION TESTING (OUTPATIENT)
Dept: ADMISSIONS | Facility: MEDICAL CENTER | Age: 60
End: 2025-03-04
Attending: SURGERY
Payer: COMMERCIAL

## 2025-03-04 RX ORDER — M-VIT,TX,IRON,MINS/CALC/FOLIC 27MG-0.4MG
1 TABLET ORAL DAILY
COMMUNITY
End: 2025-03-10

## 2025-03-05 ENCOUNTER — APPOINTMENT (OUTPATIENT)
Dept: ADMISSIONS | Facility: MEDICAL CENTER | Age: 60
End: 2025-03-05
Attending: SURGERY
Payer: COMMERCIAL

## 2025-03-05 ENCOUNTER — DOCUMENTATION (OUTPATIENT)
Dept: SURGERY | Facility: MEDICAL CENTER | Age: 60
End: 2025-03-05
Payer: COMMERCIAL

## 2025-03-10 ENCOUNTER — OFFICE VISIT (OUTPATIENT)
Dept: SURGERY | Facility: MEDICAL CENTER | Age: 60
End: 2025-03-10
Payer: COMMERCIAL

## 2025-03-10 ENCOUNTER — PRE-ADMISSION TESTING (OUTPATIENT)
Dept: ADMISSIONS | Facility: MEDICAL CENTER | Age: 60
End: 2025-03-10
Attending: SURGERY
Payer: COMMERCIAL

## 2025-03-10 VITALS
SYSTOLIC BLOOD PRESSURE: 120 MMHG | BODY MASS INDEX: 20.31 KG/M2 | WEIGHT: 134 LBS | OXYGEN SATURATION: 96 % | TEMPERATURE: 96.8 F | DIASTOLIC BLOOD PRESSURE: 87 MMHG | HEIGHT: 68 IN | HEART RATE: 84 BPM

## 2025-03-10 DIAGNOSIS — Z17.0 MALIGNANT NEOPLASM OF AXILLARY TAIL OF RIGHT BREAST IN FEMALE, ESTROGEN RECEPTOR POSITIVE (HCC): ICD-10-CM

## 2025-03-10 DIAGNOSIS — G89.18 POSTOPERATIVE PAIN: ICD-10-CM

## 2025-03-10 DIAGNOSIS — Z01.812 PRE-OPERATIVE LABORATORY EXAMINATION: ICD-10-CM

## 2025-03-10 DIAGNOSIS — C50.611 MALIGNANT NEOPLASM OF AXILLARY TAIL OF RIGHT BREAST IN FEMALE, ESTROGEN RECEPTOR POSITIVE (HCC): ICD-10-CM

## 2025-03-10 PROBLEM — E04.1 THYROID NODULE: Chronic | Status: ACTIVE | Noted: 2018-05-10

## 2025-03-10 LAB
ANION GAP SERPL CALC-SCNC: 10 MMOL/L (ref 7–16)
BUN SERPL-MCNC: 18 MG/DL (ref 8–22)
CALCIUM SERPL-MCNC: 9.8 MG/DL (ref 8.5–10.5)
CHLORIDE SERPL-SCNC: 103 MMOL/L (ref 96–112)
CO2 SERPL-SCNC: 24 MMOL/L (ref 20–33)
CREAT SERPL-MCNC: 0.98 MG/DL (ref 0.5–1.4)
ERYTHROCYTE [DISTWIDTH] IN BLOOD BY AUTOMATED COUNT: 43.1 FL (ref 35.9–50)
GFR SERPLBLD CREATININE-BSD FMLA CKD-EPI: 66 ML/MIN/1.73 M 2
GLUCOSE SERPL-MCNC: 94 MG/DL (ref 65–99)
HCT VFR BLD AUTO: 43.7 % (ref 37–47)
HGB BLD-MCNC: 14.4 G/DL (ref 12–16)
MCH RBC QN AUTO: 31.5 PG (ref 27–33)
MCHC RBC AUTO-ENTMCNC: 33 G/DL (ref 32.2–35.5)
MCV RBC AUTO: 95.6 FL (ref 81.4–97.8)
PLATELET # BLD AUTO: 290 K/UL (ref 164–446)
PMV BLD AUTO: 10.3 FL (ref 9–12.9)
POTASSIUM SERPL-SCNC: 4.6 MMOL/L (ref 3.6–5.5)
RBC # BLD AUTO: 4.57 M/UL (ref 4.2–5.4)
SODIUM SERPL-SCNC: 137 MMOL/L (ref 135–145)
WBC # BLD AUTO: 6.4 K/UL (ref 4.8–10.8)

## 2025-03-10 PROCEDURE — 99203 OFFICE O/P NEW LOW 30 MIN: CPT

## 2025-03-10 PROCEDURE — 3079F DIAST BP 80-89 MM HG: CPT

## 2025-03-10 PROCEDURE — 36415 COLL VENOUS BLD VENIPUNCTURE: CPT

## 2025-03-10 PROCEDURE — 85027 COMPLETE CBC AUTOMATED: CPT

## 2025-03-10 PROCEDURE — 3074F SYST BP LT 130 MM HG: CPT

## 2025-03-10 PROCEDURE — 80048 BASIC METABOLIC PNL TOTAL CA: CPT

## 2025-03-10 RX ORDER — ONDANSETRON 4 MG/1
4 TABLET, FILM COATED ORAL EVERY 8 HOURS PRN
Qty: 9 TABLET | Refills: 0 | Status: SHIPPED | OUTPATIENT
Start: 2025-03-10 | End: 2025-03-13

## 2025-03-10 RX ORDER — TRAMADOL HYDROCHLORIDE 50 MG/1
50 TABLET ORAL EVERY 6 HOURS PRN
Qty: 8 TABLET | Refills: 0 | Status: SHIPPED | OUTPATIENT
Start: 2025-03-10 | End: 2025-03-12

## 2025-03-10 ASSESSMENT — ENCOUNTER SYMPTOMS
RESPIRATORY NEGATIVE: 1
PSYCHIATRIC NEGATIVE: 1
EYES NEGATIVE: 1
CONSTITUTIONAL NEGATIVE: 1
MUSCULOSKELETAL NEGATIVE: 1
GASTROINTESTINAL NEGATIVE: 1
NEUROLOGICAL NEGATIVE: 1
CARDIOVASCULAR NEGATIVE: 1

## 2025-03-10 ASSESSMENT — FIBROSIS 4 INDEX: FIB4 SCORE: 0.82

## 2025-03-10 NOTE — PROGRESS NOTES
"         SUBJECTIVE:   Suzy Vazquez is a delightful 59 y.o. female who presents for pre-op visit and H&P update for known Right breast cancer. She will be undergoing a Right Partial Mastectomy, SLNBx, possible axillary dissection on 03/14/2025. Currently, she reports no new breast symptoms or changes to her health since last visit. There are no changes in her functional status and she is overall doing well.      Review of Systems   Constitutional: Negative.    HENT: Negative.     Eyes: Negative.    Respiratory: Negative.     Cardiovascular: Negative.    Gastrointestinal: Negative.    Genitourinary: Negative.    Musculoskeletal: Negative.    Skin: Negative.    Neurological: Negative.    Endo/Heme/Allergies: Negative.    Psychiatric/Behavioral: Negative.          OBJECTIVE:  /87 (BP Location: Left arm, Patient Position: Sitting, BP Cuff Size: Large adult)   Pulse 84   Temp 36 °C (96.8 °F) (Temporal)   Ht 1.727 m (5' 8\")   Wt 60.8 kg (134 lb)   SpO2 96%   BMI 20.37 kg/m²    General: Alert, oriented, pleasant, no acute distress.  HEENT: Extraocular movements intact, no scleral icterus or conjunctival injection.  Lung: Respirations unlabored on room air. Lung sounds clear in all fields.  Cardio: Normal rate and rhythm. Heart sounds normal.  Abdomen: Soft, nondistended.  Extremities: Warm, well-perfused.  Breasts: Bilateral breasts examined in the upright and supine positions. No suspicious skin findings on either side (erythema, peau d'orange).  Bilateral breasts are very dense with a dominant ~0.5cm firm nodule in the right breast axillary tail/UOQ area. Bilateral nipples everted without expressible discharge.  No unexpected contour abnormalities. No palpable cervical, supraclavicular, or axillary adenopathy.     FUNCTIONAL ASSESSMENT  Patient responses to questions:    Have you ever had shoulder surgery or been treated for a shoulder injury that resulted in a loss of range of motion?  No     Are you " unable to reach into an upper cabinet and retrieve a cup or plate?  No     Do you have any limitations in daily activities because of your shoulder?  No     Overhead raise test for shoulder flexion:  Normal Range:  150-180 degrees     [facility] ECOG Performance Status categories: 0= Fully active, able to carry on all pre-disease performance without restriction.    ASSESSMENT AND PLAN:  Delightful 59 y.o. female, here for pre-op visit.   Currently she is doing well, without any changes to her medical record. We discussed the plan for R PM, SLNBx, poss. ALND. She agrees and elects to proceed with surgery as scheduled.  Pre-op labs and EKG reviewed. We discussed in depth surgical and postoperative expectations, including activity restrictions, dressing management, pain management, etc. Post-operative instructions provided at the consultation with an expectation of additional instructions day of surgery. She is aware to be NPO after midnight the night prior to surgery, aside from 16oz of water 3 hours prior, and stop necessary medications according to preop recommendations. We discussed that her pathology results will be available via [employer], however, Dr. Diaz will review the results in detail at her postoperative appointment and answer any questions regarding the results at that time. I have prescribed postop pain and anti-nausea medications to her requested pharmacy. All questions have been answered in detail.     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 on 2/18/2025       External imaging and reports were independently reviewed.  I spent 35 minutes today preparing to see the patient (including chart preparation and review), obtaining and reviewing additional medical history, performing a physical exam and evaluation, documenting clinical information in the electronic health record, independently interpreting results, communicating results to the patient, and coordinating care.     Brooke Miles PA-C

## 2025-03-11 ENCOUNTER — HOSPITAL ENCOUNTER (OUTPATIENT)
Dept: RADIOLOGY | Facility: MEDICAL CENTER | Age: 60
End: 2025-03-11
Attending: SURGERY
Payer: COMMERCIAL

## 2025-03-11 DIAGNOSIS — R92.8 ABNORMAL FINDINGS ON DIAGNOSTIC IMAGING OF BREAST: ICD-10-CM

## 2025-03-11 DIAGNOSIS — Z17.0 MALIGNANT NEOPLASM OF AXILLARY TAIL OF RIGHT BREAST IN FEMALE, ESTROGEN RECEPTOR POSITIVE (HCC): ICD-10-CM

## 2025-03-11 DIAGNOSIS — C50.611 MALIGNANT NEOPLASM OF AXILLARY TAIL OF RIGHT BREAST IN FEMALE, ESTROGEN RECEPTOR POSITIVE (HCC): ICD-10-CM

## 2025-03-11 PROCEDURE — 76642 ULTRASOUND BREAST LIMITED: CPT | Mod: LT

## 2025-03-11 NOTE — PROGRESS NOTES
"          Primary Care Provider Matthew Bagn D.O.   Referring Provider: Christi Holbrook MD   Surgical Oncologist: Christi Holbrook MD   Medical Oncologist: Litzy Sorensen MD    HPI:  59 y.o. yo    Patient referred for wellness / lifestyle medicine care after   Diagnosis of R IDC - ER/MT+HER2- 2025  R partial mastectomy SLNB 3/14/2025 (in 2 days)   Appt with Dr Sorensen - 3/18/2025  CHEK2 gene     This very delightful patient presents today as a referral from Dr. Diaz to discuss lifestyle factors after her cancer diagnosis as well as strategies to manage of menopause off of menopause hormone therapy.  She states that up until the time of diagnosis she was using a compounded testosterone product as well as a combo estrone and estradiol.  This was ultimately transition to an estradiol patch due to low hormone levels she reports.  She also uses a vaginal estrogen tablet.  She stopped her hormones about couple weeks ago.  She states overall she is doing okay she has continued the vaginal estrogen per Dr. Diaz.  She is not having significant hot flashes or night sweats but is worried about the onset of symptoms particularly brain fog and her bone health.    Her menopause care has been provided by what she describes as a \"holistic doctor\" in [place].  She also underwent treatment for mold, parasites, as well as a variety of other functional medicine testing.    She describes her lifestyle as being very healthy although she feels that her cancer diagnosis was related to the stress of going through a divorce 1 year ago.  She is starting to date again and is in a much better mental place.  She does go to therapy regularly, is practicing forgiveness, and has tools that she uses for her mental health such as exercise journaling and meditation.  She also has really good support systems in place which are primarily her friends.    On health she had a DEXA scan in  which was normal but " she has been on estrogen for several years.    From a nutrition standpoint she cooks for herself, lives alone will make lean proteins and veggies and eat for several days.  She does do a protein shake in the morning with greens and micro drains and blueberries and a variety of things added.  She does have sweet potato chips as sweet snack or will do cookout and coconut milk.  She is given up alcohol she is concerned that she does not eat enough fiber.  She was under the impression that the meat that she was eating added fiber to her diet and we discussed otherwise.    From an exercise standpoint she walks almost daily in his own to intensity and then works out with a  twice a week for 60 minutes doing strength training.  She also has a rock pack that she uses with walking and hiking.      Suzy Vazquez has a current medication list which includes the following prescription(s): ondansetron, tramadol, and estradiol.     Patient Active Problem List   Diagnosis    Allergic rhinitis    Asthma    Atrophic vaginitis    Hormone replacement therapy    Thyroid nodule    Hypothyroidism    Pancreatic insufficiency    Fibrocystic changes of left breast    Hematoma    Hemorrhoids    Genitourinary syndrome of menopause    Lyme disease    Malignant neoplasm of axillary tail of right breast in female, estrogen receptor positive (HCC)        Past Surgical History:   Procedure Laterality Date    DILATION AND CURETTAGE      OTHER      nose  surgery    PRIMARY C SECTION      US-NEEDLE CORE BX-BREAST PANEL      The cyst popped when biopsy attempted        Social History     Tobacco Use    Smoking status: Never    Smokeless tobacco: Never   Vaping Use    Vaping status: Never Used   Substance Use Topics    Alcohol use: Not Currently    Drug use: Never        Family History   Problem Relation Age of Onset    Breast Cancer Mother 59    Cancer Mother         Breadt cancer    Prostate cancer Father 50    Cancer Father          Prostate    Prostate cancer Brother 58        Suzy Vazquez is allergic to sulfa drugs and sulfamethoxazole.     Vitals:    03/12/25 1030   BP: 106/75   Pulse: 77   Temp: 35.9 °C (96.7 °F)   SpO2: 96%     Body mass index is 20.07 kg/m².  Wt 132    Physical Exam  Vitals reviewed.   Constitutional:       Appearance: Normal appearance.   Skin:     General: Skin is warm.   Neurological:      General: No focal deficit present.      Mental Status: She is alert and oriented to person, place, and time.   Psychiatric:         Mood and Affect: Mood normal.         Behavior: Behavior normal.         Thought Content: Thought content normal.          1. Malignant neoplasm of axillary tail of right breast in female, estrogen receptor positive (HCC)    2. Menopause       We reviewed the goal of the oncology wellness clinic is to assist her in optimizing health, sense of well-being, reducing recurrence risk, and improving quality of life after a cancer diagnosis. In the situation of those without a cancer diagnosis but at high risk, our focus is on lifestyle focused prevention strategies. We identified the following areas which are currently identified as priorities to work on by this patient:     nutrition to support her cancer care or recovery , optimizing sleep and energy levels, and mental and emotional health     Our current plan includes:   We discussed a wide variety of lifestyle factors and the importance they play in cancer survivorship, not only to optimize short and long term health, but to reduce the risk of recurrence, and improve quality of life in all stages of the disease. , I reviewed the lifestyle data specific to breast cancer recurrence risk and adherence to lifestyle recommendations from: Jigna RA, Matthew KM, Royal EW, et al. Adherence to Cancer Prevention Lifestyle Recommendations Before, During, and 2 Years After Treatment for High-risk Breast Cancer. OSORIO Netw Open. 2023;6(5):k4101675.  doi:10.1001/jamanetworkopen.2023.54209 and the specifics of these recommendations were reviewed. , The patient is noted to currently have a lifestyle very much aligned with these recommendations., Patient was given my packet of lifestyle resources, including nutrition and physical activity information , The benefits of a more plant-forward diet, focused on a wide variety of fruits, vegetables, legumes, nuts, seeds, and lean proteins was explained. , The patient and I discussed a goal of incorporating more fiber as well as diversity, slowly increasing to 25-30 grams a day. There is also benefit to a wide diversity of fiber sources. , The patient will work toward 5-8 servings of fruits/ vegetables per day, which helps also meet fiber goals. , and Discussed the ultimate goal being 150 - 300 min a week of moderate intensity movement AND 2 strength training workouts / week. But it is important for patients to start where they are and slowly increase to these goals     The patient's main concern is management of menopausal concerns off of menopause hormone therapy.  Also particular concern of her bone health and how to maintain it.  We discussed the importance of weightbearing exercise as well as strength specific weight training focused on the major muscle groups of the body.  We discussed potential benefit to her rock pack that she walks with as well.  I recommended she continue her vitamin D replacement as well as calcium supplementation or dietary calcium intake of 1200 mg a day.  She would like to do this through dietary measures and so we will use the my fitness pal ester to make sure she is getting enough dietary calcium.  I will go ahead and order a DEXA scan because her last was done 2 years ago.    We also discussed just some patients and time over the next several weeks to see how she is feeling off of the hormones I did explain that I have some nonhormonal strategies I also reviewed the sheet and my packet of  patient information on nonhormonal strategies including acupuncture which can be used for vasomotor symptoms as well as insomnia associated with menopause.  She would like me to go ahead and place a referral for this.    Patient will Pap plan to follow-up in 6 weeks.    Sexual Health Plan:   Patient will continue to use the vaginal estrogen tablets that she was previously prescribed for genitourinary syndrome of menopause.  We discussed strategies to lower systemic absorption including using a tablet instead of a cream as well as placement of the tablet in the lower part of the vagina versus high.    Total time spent today, including personal review of past history, face to face time, chart documentation, and  was 71  minutes.

## 2025-03-12 ENCOUNTER — OFFICE VISIT (OUTPATIENT)
Dept: SURGERY | Facility: MEDICAL CENTER | Age: 60
End: 2025-03-12
Payer: COMMERCIAL

## 2025-03-12 ENCOUNTER — HOSPITAL ENCOUNTER (OUTPATIENT)
Dept: RADIOLOGY | Facility: MEDICAL CENTER | Age: 60
End: 2025-03-12
Attending: SURGERY
Payer: COMMERCIAL

## 2025-03-12 VITALS
SYSTOLIC BLOOD PRESSURE: 106 MMHG | HEIGHT: 68 IN | OXYGEN SATURATION: 96 % | TEMPERATURE: 96.7 F | HEART RATE: 77 BPM | DIASTOLIC BLOOD PRESSURE: 75 MMHG | BODY MASS INDEX: 20 KG/M2 | WEIGHT: 132 LBS

## 2025-03-12 DIAGNOSIS — C50.611 MALIGNANT NEOPLASM OF AXILLARY TAIL OF RIGHT BREAST IN FEMALE, ESTROGEN RECEPTOR POSITIVE (HCC): ICD-10-CM

## 2025-03-12 DIAGNOSIS — G47.00 INSOMNIA, UNSPECIFIED TYPE: ICD-10-CM

## 2025-03-12 DIAGNOSIS — Z17.0 MALIGNANT NEOPLASM OF AXILLARY TAIL OF RIGHT BREAST IN FEMALE, ESTROGEN RECEPTOR POSITIVE (HCC): ICD-10-CM

## 2025-03-12 DIAGNOSIS — R92.8 ABNORMAL FINDINGS ON DIAGNOSTIC IMAGING OF BREAST: ICD-10-CM

## 2025-03-12 DIAGNOSIS — Z78.0 MENOPAUSE: ICD-10-CM

## 2025-03-12 LAB — PATHOLOGY CONSULT NOTE: NORMAL

## 2025-03-12 PROCEDURE — 88305 TISSUE EXAM BY PATHOLOGIST: CPT | Performed by: PATHOLOGY

## 2025-03-12 PROCEDURE — 88305 TISSUE EXAM BY PATHOLOGIST: CPT | Mod: 26 | Performed by: PATHOLOGY

## 2025-03-12 PROCEDURE — 19083 BX BREAST 1ST LESION US IMAG: CPT | Mod: LT

## 2025-03-12 PROCEDURE — 99417 PROLNG OP E/M EACH 15 MIN: CPT | Performed by: OBSTETRICS & GYNECOLOGY

## 2025-03-12 PROCEDURE — 3074F SYST BP LT 130 MM HG: CPT | Performed by: OBSTETRICS & GYNECOLOGY

## 2025-03-12 PROCEDURE — 3078F DIAST BP <80 MM HG: CPT | Performed by: OBSTETRICS & GYNECOLOGY

## 2025-03-12 PROCEDURE — 99215 OFFICE O/P EST HI 40 MIN: CPT | Performed by: OBSTETRICS & GYNECOLOGY

## 2025-03-12 PROCEDURE — 77065 DX MAMMO INCL CAD UNI: CPT

## 2025-03-12 ASSESSMENT — FIBROSIS 4 INDEX: FIB4 SCORE: 0.82

## 2025-03-12 NOTE — Clinical Note
Member Name: Suzy Vazquez   Member Number: [id number]   Reference Number: [id number]   Approved Services: Radiology Services   Approved Service Dates: 03/12/2025 - 07/10/2025   Requesting Provider: Christi Holbrook   Requested Provider: [facility]     Dear Suzy Vazquez:     The following medical service(s) requested by Christi Holbrook have been approved:    Procedure Code Procedure Code Name Requested Quantity Approved Quantity Status   91459 (CPT®) IA BX BREAST 1ST LESION US GUIDANCE 1 1 Authorized       Approved Quantity means the number of visits approved for medication treatments and/or medical services.    The services should be provided by [facility] no later than 07/10/2025. Please contact the provider listed below with any questions.     Provider Information:  [facility]  [phone]    Your plan benefit may require a deductible, co-payment or coinsurance for these services. This authorization does not guarantee [facility] will pay the claim for services that you receive. Payment by [facility] for these services is subject to the terms of your Evidence of Coverage or Summary Plan Description, your eligibility at the time of service, and confirmation of benefit coverage.    For any questions or additional information, please contact Customer Service:    [facility]  Customer Service: [phone] or toll free [phone]  TTY users dial: [phone]   Call Center Hours: Mon - Fri 7 AM to 8 PM PST   Office Hours: Mon - Fri 8 AM to 5 PM [facility]   E-mail: [email or web address]   Website: [email or web address]       This information is available for free in other languages. Please contact Customer Service at the phone number above for more information. [facility] complies with applicable Federal civil rights laws and does not discriminate on the basis of race, color, national origin, age, disability  or sex.      Sincerely,     Healthcare Utilization Management Department     Cc: [facility]   Christi Holbrook

## 2025-03-13 ENCOUNTER — RESULTS FOLLOW-UP (OUTPATIENT)
Dept: SURGERY | Facility: MEDICAL CENTER | Age: 60
End: 2025-03-13
Payer: COMMERCIAL

## 2025-03-13 ENCOUNTER — ANESTHESIA EVENT (OUTPATIENT)
Dept: SURGERY | Facility: MEDICAL CENTER | Age: 60
End: 2025-03-13
Payer: COMMERCIAL

## 2025-03-13 ENCOUNTER — TELEPHONE (OUTPATIENT)
Dept: RADIOLOGY | Facility: MEDICAL CENTER | Age: 60
End: 2025-03-13
Payer: COMMERCIAL

## 2025-03-14 ENCOUNTER — APPOINTMENT (OUTPATIENT)
Dept: RADIOLOGY | Facility: MEDICAL CENTER | Age: 60
End: 2025-03-14
Attending: SURGERY
Payer: COMMERCIAL

## 2025-03-14 ENCOUNTER — HOSPITAL ENCOUNTER (OUTPATIENT)
Facility: MEDICAL CENTER | Age: 60
End: 2025-03-14
Attending: SURGERY | Admitting: SURGERY
Payer: COMMERCIAL

## 2025-03-14 ENCOUNTER — ANESTHESIA (OUTPATIENT)
Dept: SURGERY | Facility: MEDICAL CENTER | Age: 60
End: 2025-03-14
Payer: COMMERCIAL

## 2025-03-14 VITALS
HEART RATE: 83 BPM | SYSTOLIC BLOOD PRESSURE: 106 MMHG | RESPIRATION RATE: 20 BRPM | BODY MASS INDEX: 19.95 KG/M2 | HEIGHT: 68 IN | WEIGHT: 131.61 LBS | TEMPERATURE: 98.2 F | DIASTOLIC BLOOD PRESSURE: 69 MMHG | OXYGEN SATURATION: 92 %

## 2025-03-14 DIAGNOSIS — C50.611: ICD-10-CM

## 2025-03-14 LAB — PATHOLOGY CONSULT NOTE: NORMAL

## 2025-03-14 PROCEDURE — 160041 HCHG SURGERY MINUTES - EA ADDL 1 MIN LEVEL 4: Performed by: SURGERY

## 2025-03-14 PROCEDURE — 700105 HCHG RX REV CODE 258: Performed by: ANESTHESIOLOGY

## 2025-03-14 PROCEDURE — 700102 HCHG RX REV CODE 250 W/ 637 OVERRIDE(OP): Performed by: SURGERY

## 2025-03-14 PROCEDURE — 160009 HCHG ANES TIME/MIN: Performed by: SURGERY

## 2025-03-14 PROCEDURE — 700111 HCHG RX REV CODE 636 W/ 250 OVERRIDE (IP): Performed by: ANESTHESIOLOGY

## 2025-03-14 PROCEDURE — A9270 NON-COVERED ITEM OR SERVICE: HCPCS | Performed by: SURGERY

## 2025-03-14 PROCEDURE — 160048 HCHG OR STATISTICAL LEVEL 1-5: Performed by: SURGERY

## 2025-03-14 PROCEDURE — 76098 X-RAY EXAM SURGICAL SPECIMEN: CPT | Mod: 26 | Performed by: SURGERY

## 2025-03-14 PROCEDURE — 160046 HCHG PACU - 1ST 60 MINS PHASE II: Performed by: SURGERY

## 2025-03-14 PROCEDURE — 38900 IO MAP OF SENT LYMPH NODE: CPT | Mod: RT | Performed by: SURGERY

## 2025-03-14 PROCEDURE — 76098 X-RAY EXAM SURGICAL SPECIMEN: CPT | Mod: RT

## 2025-03-14 PROCEDURE — 88307 TISSUE EXAM BY PATHOLOGIST: CPT | Mod: 26 | Performed by: PATHOLOGY

## 2025-03-14 PROCEDURE — 700101 HCHG RX REV CODE 250: Performed by: ANESTHESIOLOGY

## 2025-03-14 PROCEDURE — 700102 HCHG RX REV CODE 250 W/ 637 OVERRIDE(OP): Performed by: ANESTHESIOLOGY

## 2025-03-14 PROCEDURE — 160025 RECOVERY II MINUTES (STATS): Performed by: SURGERY

## 2025-03-14 PROCEDURE — 88307 TISSUE EXAM BY PATHOLOGIST: CPT | Performed by: PATHOLOGY

## 2025-03-14 PROCEDURE — 76098 X-RAY EXAM SURGICAL SPECIMEN: CPT | Mod: AS,26

## 2025-03-14 PROCEDURE — A9541 TC99M SULFUR COLLOID: HCPCS | Mod: RT

## 2025-03-14 PROCEDURE — 38525 BIOPSY/REMOVAL LYMPH NODES: CPT | Mod: RT | Performed by: SURGERY

## 2025-03-14 PROCEDURE — 38525 BIOPSY/REMOVAL LYMPH NODES: CPT | Mod: AS,RT

## 2025-03-14 PROCEDURE — 19301 PARTIAL MASTECTOMY: CPT | Mod: AS,RT

## 2025-03-14 PROCEDURE — 700101 HCHG RX REV CODE 250: Performed by: SURGERY

## 2025-03-14 PROCEDURE — 38900 IO MAP OF SENT LYMPH NODE: CPT | Mod: AS,RT

## 2025-03-14 PROCEDURE — 88329 PATH CONSLTJ DRG SURG: CPT | Performed by: PATHOLOGY

## 2025-03-14 PROCEDURE — 160035 HCHG PACU - 1ST 60 MINS PHASE I: Performed by: SURGERY

## 2025-03-14 PROCEDURE — 160029 HCHG SURGERY MINUTES - 1ST 30 MINS LEVEL 4: Performed by: SURGERY

## 2025-03-14 PROCEDURE — 160015 HCHG STAT PREOP MINUTES: Performed by: SURGERY

## 2025-03-14 PROCEDURE — A9270 NON-COVERED ITEM OR SERVICE: HCPCS | Performed by: ANESTHESIOLOGY

## 2025-03-14 PROCEDURE — 19301 PARTIAL MASTECTOMY: CPT | Mod: RT | Performed by: SURGERY

## 2025-03-14 PROCEDURE — 700111 HCHG RX REV CODE 636 W/ 250 OVERRIDE (IP): Performed by: SURGERY

## 2025-03-14 PROCEDURE — 160002 HCHG RECOVERY MINUTES (STAT): Performed by: SURGERY

## 2025-03-14 RX ORDER — DEXMEDETOMIDINE HYDROCHLORIDE 100 UG/ML
INJECTION, SOLUTION INTRAVENOUS PRN
Status: DISCONTINUED | OUTPATIENT
Start: 2025-03-14 | End: 2025-03-14 | Stop reason: SURG

## 2025-03-14 RX ORDER — LIDOCAINE HYDROCHLORIDE 20 MG/ML
INJECTION, SOLUTION EPIDURAL; INFILTRATION; INTRACAUDAL; PERINEURAL PRN
Status: DISCONTINUED | OUTPATIENT
Start: 2025-03-14 | End: 2025-03-14 | Stop reason: SURG

## 2025-03-14 RX ORDER — ISOSULFAN BLUE 50 MG/5ML
INJECTION, SOLUTION SUBCUTANEOUS
Status: DISCONTINUED | OUTPATIENT
Start: 2025-03-14 | End: 2025-03-14 | Stop reason: HOSPADM

## 2025-03-14 RX ORDER — LIDOCAINE HYDROCHLORIDE 10 MG/ML
INJECTION, SOLUTION EPIDURAL; INFILTRATION; INTRACAUDAL; PERINEURAL
Status: DISCONTINUED | OUTPATIENT
Start: 2025-03-14 | End: 2025-03-14 | Stop reason: HOSPADM

## 2025-03-14 RX ORDER — EPHEDRINE SULFATE 50 MG/ML
INJECTION, SOLUTION INTRAVENOUS PRN
Status: DISCONTINUED | OUTPATIENT
Start: 2025-03-14 | End: 2025-03-14 | Stop reason: SURG

## 2025-03-14 RX ORDER — ONDANSETRON 2 MG/ML
INJECTION INTRAMUSCULAR; INTRAVENOUS PRN
Status: DISCONTINUED | OUTPATIENT
Start: 2025-03-14 | End: 2025-03-14 | Stop reason: SURG

## 2025-03-14 RX ORDER — MEPERIDINE HYDROCHLORIDE 25 MG/ML
12.5 INJECTION INTRAMUSCULAR; INTRAVENOUS; SUBCUTANEOUS
Status: DISCONTINUED | OUTPATIENT
Start: 2025-03-14 | End: 2025-03-14 | Stop reason: HOSPADM

## 2025-03-14 RX ORDER — ACETAMINOPHEN 500 MG
1000 TABLET ORAL ONCE
Status: COMPLETED | OUTPATIENT
Start: 2025-03-14 | End: 2025-03-14

## 2025-03-14 RX ORDER — EPHEDRINE SULFATE 50 MG/ML
5 INJECTION, SOLUTION INTRAVENOUS
Status: DISCONTINUED | OUTPATIENT
Start: 2025-03-14 | End: 2025-03-14 | Stop reason: HOSPADM

## 2025-03-14 RX ORDER — DIPHENHYDRAMINE HYDROCHLORIDE 50 MG/ML
25 INJECTION, SOLUTION INTRAMUSCULAR; INTRAVENOUS EVERY 6 HOURS PRN
Status: DISCONTINUED | OUTPATIENT
Start: 2025-03-14 | End: 2025-03-14 | Stop reason: HOSPADM

## 2025-03-14 RX ORDER — BUPIVACAINE HYDROCHLORIDE 2.5 MG/ML
INJECTION, SOLUTION EPIDURAL; INFILTRATION; INTRACAUDAL; PERINEURAL
Status: DISCONTINUED | OUTPATIENT
Start: 2025-03-14 | End: 2025-03-14 | Stop reason: HOSPADM

## 2025-03-14 RX ORDER — HALOPERIDOL 5 MG/ML
1 INJECTION INTRAMUSCULAR
Status: DISCONTINUED | OUTPATIENT
Start: 2025-03-14 | End: 2025-03-14 | Stop reason: HOSPADM

## 2025-03-14 RX ORDER — DIPHENHYDRAMINE HCL 25 MG
25 TABLET ORAL EVERY 6 HOURS PRN
Status: DISCONTINUED | OUTPATIENT
Start: 2025-03-14 | End: 2025-03-14 | Stop reason: HOSPADM

## 2025-03-14 RX ORDER — OXYCODONE HCL 5 MG/5 ML
10 SOLUTION, ORAL ORAL
Status: DISCONTINUED | OUTPATIENT
Start: 2025-03-14 | End: 2025-03-14 | Stop reason: HOSPADM

## 2025-03-14 RX ORDER — IPRATROPIUM BROMIDE AND ALBUTEROL SULFATE 2.5; .5 MG/3ML; MG/3ML
3 SOLUTION RESPIRATORY (INHALATION)
Status: DISCONTINUED | OUTPATIENT
Start: 2025-03-14 | End: 2025-03-14 | Stop reason: HOSPADM

## 2025-03-14 RX ORDER — DIPHENHYDRAMINE HYDROCHLORIDE 50 MG/ML
12.5 INJECTION, SOLUTION INTRAMUSCULAR; INTRAVENOUS
Status: DISCONTINUED | OUTPATIENT
Start: 2025-03-14 | End: 2025-03-14 | Stop reason: HOSPADM

## 2025-03-14 RX ORDER — HYDRALAZINE HYDROCHLORIDE 20 MG/ML
5 INJECTION INTRAMUSCULAR; INTRAVENOUS
Status: DISCONTINUED | OUTPATIENT
Start: 2025-03-14 | End: 2025-03-14 | Stop reason: HOSPADM

## 2025-03-14 RX ORDER — METHOCARBAMOL 500 MG/1
500 TABLET, FILM COATED ORAL ONCE
Status: COMPLETED | OUTPATIENT
Start: 2025-03-14 | End: 2025-03-14

## 2025-03-14 RX ORDER — LIDOCAINE HYDROCHLORIDE 10 MG/ML
INJECTION, SOLUTION EPIDURAL; INFILTRATION; INTRACAUDAL; PERINEURAL
Status: DISCONTINUED
Start: 2025-03-14 | End: 2025-03-14 | Stop reason: HOSPADM

## 2025-03-14 RX ORDER — LIDOCAINE AND PRILOCAINE 25; 25 MG/G; MG/G
CREAM TOPICAL ONCE
Status: COMPLETED | OUTPATIENT
Start: 2025-03-14 | End: 2025-03-14

## 2025-03-14 RX ORDER — MIDAZOLAM HYDROCHLORIDE 1 MG/ML
INJECTION INTRAMUSCULAR; INTRAVENOUS PRN
Status: DISCONTINUED | OUTPATIENT
Start: 2025-03-14 | End: 2025-03-14 | Stop reason: SURG

## 2025-03-14 RX ORDER — SODIUM CHLORIDE, SODIUM LACTATE, POTASSIUM CHLORIDE, CALCIUM CHLORIDE 600; 310; 30; 20 MG/100ML; MG/100ML; MG/100ML; MG/100ML
INJECTION, SOLUTION INTRAVENOUS CONTINUOUS
Status: DISCONTINUED | OUTPATIENT
Start: 2025-03-14 | End: 2025-03-14 | Stop reason: HOSPADM

## 2025-03-14 RX ORDER — HYDROMORPHONE HYDROCHLORIDE 1 MG/ML
0.1 INJECTION, SOLUTION INTRAMUSCULAR; INTRAVENOUS; SUBCUTANEOUS
Status: DISCONTINUED | OUTPATIENT
Start: 2025-03-14 | End: 2025-03-14 | Stop reason: HOSPADM

## 2025-03-14 RX ORDER — ISOSULFAN BLUE 50 MG/5ML
INJECTION, SOLUTION SUBCUTANEOUS
Status: DISCONTINUED
Start: 2025-03-14 | End: 2025-03-14 | Stop reason: HOSPADM

## 2025-03-14 RX ORDER — BUPIVACAINE HYDROCHLORIDE 2.5 MG/ML
INJECTION, SOLUTION EPIDURAL; INFILTRATION; INTRACAUDAL; PERINEURAL
Status: DISCONTINUED
Start: 2025-03-14 | End: 2025-03-14 | Stop reason: HOSPADM

## 2025-03-14 RX ORDER — SODIUM CHLORIDE, SODIUM LACTATE, POTASSIUM CHLORIDE, CALCIUM CHLORIDE 600; 310; 30; 20 MG/100ML; MG/100ML; MG/100ML; MG/100ML
INJECTION, SOLUTION INTRAVENOUS
Status: DISCONTINUED | OUTPATIENT
Start: 2025-03-14 | End: 2025-03-14 | Stop reason: SURG

## 2025-03-14 RX ORDER — ONDANSETRON 4 MG/1
4 TABLET, ORALLY DISINTEGRATING ORAL EVERY 8 HOURS PRN
COMMUNITY

## 2025-03-14 RX ORDER — HYDROMORPHONE HYDROCHLORIDE 1 MG/ML
0.4 INJECTION, SOLUTION INTRAMUSCULAR; INTRAVENOUS; SUBCUTANEOUS
Status: DISCONTINUED | OUTPATIENT
Start: 2025-03-14 | End: 2025-03-14 | Stop reason: HOSPADM

## 2025-03-14 RX ORDER — ALPRAZOLAM 0.25 MG
.25-.5 TABLET ORAL PRN
Status: DISCONTINUED | OUTPATIENT
Start: 2025-03-14 | End: 2025-03-14 | Stop reason: HOSPADM

## 2025-03-14 RX ORDER — ONDANSETRON 2 MG/ML
4 INJECTION INTRAMUSCULAR; INTRAVENOUS
Status: DISCONTINUED | OUTPATIENT
Start: 2025-03-14 | End: 2025-03-14 | Stop reason: HOSPADM

## 2025-03-14 RX ORDER — OXYCODONE HCL 5 MG/5 ML
5 SOLUTION, ORAL ORAL
Status: DISCONTINUED | OUTPATIENT
Start: 2025-03-14 | End: 2025-03-14 | Stop reason: HOSPADM

## 2025-03-14 RX ORDER — DEXAMETHASONE SODIUM PHOSPHATE 4 MG/ML
INJECTION, SOLUTION INTRA-ARTICULAR; INTRALESIONAL; INTRAMUSCULAR; INTRAVENOUS; SOFT TISSUE PRN
Status: DISCONTINUED | OUTPATIENT
Start: 2025-03-14 | End: 2025-03-14 | Stop reason: SURG

## 2025-03-14 RX ORDER — CEFAZOLIN SODIUM 1 G/3ML
INJECTION, POWDER, FOR SOLUTION INTRAMUSCULAR; INTRAVENOUS PRN
Status: DISCONTINUED | OUTPATIENT
Start: 2025-03-14 | End: 2025-03-14 | Stop reason: SURG

## 2025-03-14 RX ORDER — HYDROMORPHONE HYDROCHLORIDE 1 MG/ML
0.2 INJECTION, SOLUTION INTRAMUSCULAR; INTRAVENOUS; SUBCUTANEOUS
Status: DISCONTINUED | OUTPATIENT
Start: 2025-03-14 | End: 2025-03-14 | Stop reason: HOSPADM

## 2025-03-14 RX ADMIN — LIDOCAINE AND PRILOCAINE 1 APPLICATION: 25; 25 CREAM TOPICAL at 10:29

## 2025-03-14 RX ADMIN — METHOCARBAMOL 500 MG: 500 TABLET ORAL at 12:26

## 2025-03-14 RX ADMIN — FENTANYL CITRATE 50 MCG: 50 INJECTION, SOLUTION INTRAMUSCULAR; INTRAVENOUS at 13:43

## 2025-03-14 RX ADMIN — ACETAMINOPHEN 1000 MG: 500 TABLET ORAL at 12:26

## 2025-03-14 RX ADMIN — ONDANSETRON 4 MG: 2 INJECTION INTRAMUSCULAR; INTRAVENOUS at 13:43

## 2025-03-14 RX ADMIN — FENTANYL CITRATE 50 MCG: 50 INJECTION, SOLUTION INTRAMUSCULAR; INTRAVENOUS at 13:36

## 2025-03-14 RX ADMIN — CEFAZOLIN 2 G: 1 INJECTION, POWDER, FOR SOLUTION INTRAMUSCULAR; INTRAVENOUS at 13:03

## 2025-03-14 RX ADMIN — FENTANYL CITRATE 50 MCG: 50 INJECTION, SOLUTION INTRAMUSCULAR; INTRAVENOUS at 13:01

## 2025-03-14 RX ADMIN — DEXMEDETOMIDINE HYDROCHLORIDE 15 MCG: 100 INJECTION, SOLUTION INTRAVENOUS at 13:41

## 2025-03-14 RX ADMIN — LIDOCAINE HYDROCHLORIDE 60 MG: 20 INJECTION, SOLUTION EPIDURAL; INFILTRATION; INTRACAUDAL; PERINEURAL at 13:03

## 2025-03-14 RX ADMIN — MIDAZOLAM HYDROCHLORIDE 2 MG: 1 INJECTION, SOLUTION INTRAMUSCULAR; INTRAVENOUS at 12:59

## 2025-03-14 RX ADMIN — SODIUM CHLORIDE, POTASSIUM CHLORIDE, SODIUM LACTATE AND CALCIUM CHLORIDE: 600; 310; 30; 20 INJECTION, SOLUTION INTRAVENOUS at 12:57

## 2025-03-14 RX ADMIN — FENTANYL CITRATE 50 MCG: 50 INJECTION, SOLUTION INTRAMUSCULAR; INTRAVENOUS at 13:47

## 2025-03-14 RX ADMIN — DEXMEDETOMIDINE HYDROCHLORIDE 15 MCG: 100 INJECTION, SOLUTION INTRAVENOUS at 13:10

## 2025-03-14 RX ADMIN — DEXAMETHASONE SODIUM PHOSPHATE 8 MG: 4 INJECTION INTRA-ARTICULAR; INTRALESIONAL; INTRAMUSCULAR; INTRAVENOUS; SOFT TISSUE at 13:09

## 2025-03-14 RX ADMIN — ALPRAZOLAM 0.25 MG: 0.25 TABLET ORAL at 10:29

## 2025-03-14 RX ADMIN — EPHEDRINE SULFATE 10 MG: 50 INJECTION, SOLUTION INTRAVENOUS at 13:19

## 2025-03-14 RX ADMIN — PROPOFOL 150 MG: 10 INJECTION, EMULSION INTRAVENOUS at 13:03

## 2025-03-14 RX ADMIN — EPHEDRINE SULFATE 15 MG: 50 INJECTION, SOLUTION INTRAVENOUS at 13:26

## 2025-03-14 RX ADMIN — SODIUM CHLORIDE, POTASSIUM CHLORIDE, SODIUM LACTATE AND CALCIUM CHLORIDE: 600; 310; 30; 20 INJECTION, SOLUTION INTRAVENOUS at 14:12

## 2025-03-14 RX ADMIN — FENTANYL CITRATE 50 MCG: 50 INJECTION, SOLUTION INTRAMUSCULAR; INTRAVENOUS at 14:12

## 2025-03-14 ASSESSMENT — PAIN DESCRIPTION - PAIN TYPE
TYPE: SURGICAL PAIN

## 2025-03-14 ASSESSMENT — FIBROSIS 4 INDEX: FIB4 SCORE: 0.82

## 2025-03-14 ASSESSMENT — PAIN SCALES - GENERAL: PAIN_LEVEL: 0

## 2025-03-14 NOTE — ANESTHESIA PREPROCEDURE EVALUATION
Case: [id number] Date/Time: 03/14/25 1315    Procedures:       RIGHT PARTIAL MASTECTOMY, RIGHT SENTINEL LYMPH NODE INJECTION AND BIOPSY, POSSIBLE AXILLARY DISSECTION      BIOPSY, LYMPH NODE, SENTINEL    Pre-op diagnosis: RIGHT BREAST CANCER    Location: [facility] ROOM 28 / SURGERY SAME DAY [facility]    Surgeons: Christi Lopez M.D.            Relevant Problems   PULMONARY   (positive) Asthma      CARDIAC   (positive) Hemorrhoids      ENDO   (positive) Hypothyroidism      Other   (positive) Allergic rhinitis   (positive) Lyme disease   (positive) Thyroid nodule       Physical Exam    Airway   Mallampati: II  TM distance: >3 FB  Neck ROM: full       Cardiovascular - normal exam  Rhythm: regular  Rate: normal  (-) murmur     Dental - normal exam           Pulmonary - normal exam  Breath sounds clear to auscultation     Abdominal    Neurological - normal exam                   Anesthesia Plan    ASA 2       Plan - general       Airway plan will be LMA          Induction: intravenous    Postoperative Plan: Postoperative administration of opioids is intended.    Pertinent diagnostic labs and testing reviewed    Informed Consent:    Anesthetic plan and risks discussed with patient.    Use of blood products discussed with: patient whom consented to blood products.

## 2025-03-14 NOTE — OR NURSING
1524 Report received from ERMELINDA Gaffney, assumed care of patient at this time.    1533 Discharge instructions discussed with patient. All questions answered. Verbalized understanding.    1535 Patient's son in law called with updates.    1551 PIV removed with tip intact.    1553 Pt escorted out of department in wheelchair with all belongings. Discharged home to responsible adult.

## 2025-03-14 NOTE — ANESTHESIA PROCEDURE NOTES
Airway    Date/Time: 3/14/2025 1:04 PM    Performed by: Marlin East M.D.  Authorized by: Marlin East M.D.    Location:  OR  Urgency:  Elective  Difficult Airway: No    Indications for Airway Management:  Anesthesia      Spontaneous Ventilation: absent    Sedation Level:  Deep  Preoxygenated: Yes    Mask Difficulty Assessment:  0 - not attempted  Final Airway Type:  Supraglottic airway  Final Supraglottic Airway:  Standard LMA    SGA Size:  4  Number of Attempts at Approach:  1

## 2025-03-14 NOTE — OP REPORT
Patient Name: Suzy Vazquez   Medical Record Number: [id number]   Date of Service: 3/14/2025    Surgeon: Christi Holbrook MD [facility]  Assistant: Brooke Miles PA-C    Operation:   Right breast injection for lymphatic mapping  Right breast partial mastectomy  Right axillary sentinel lymph node biopsy (deep axillary lymph nodes below the clavipectoral fascia)  Interpretation of intraoperative specimen radiograph    Preoperative Diagnosis: Right breast axillary tail invasive ductal carcinoma, grade 1, ER+IL+HER2-, Ki67 10%, [id number] (anatomic/prognostic stage IA)  Postoperative Diagnosis: Same    Anesthesia: General anesthesia via laryngeal mask airway. Multimodal analgesia was initiated preoperatively by the anesthesia team.      Estimated Blood Loss: 5 mL    Specimens:  Right breast 10:00  Right axillary sentinel node #1, hot, blue, 4484cps  Right axillary sentinel node #2, hot, blue, 2173cps    Complications: none    Operative Findings: Intraoperative specimen radiograph demonstrated excision and capture of the mass, biopsy marker, and localization wire.    Indications: Suzy Vaqzuez is a 59 y.o. female who was diagnosed with right breast cancer on 01/31/2025 via ultrasound-guided core needle biopsy.  A thorough discussion of the indications, alternatives, risks, and benefits to partial mastectomy, axillary sentinel lymph node biopsy, possible axillary dissection was held with the patient.  All questions were answered in detail.  Informed consent was signed and placed in the chart.    Description of Operation:  The patient was brought into the operating room and placed supine on the operating table.  Preoperative antibiotics were administered and sequential compression devices were placed for venous thromboembolic prophylaxis.  Smooth General anesthesia via laryngeal mask airway was induced.  Once the patient was resting comfortably the 13 MHz linear ultrasound probe was used to identify the mass in  the right breast at the 10:00 position, 12 cm from the nipple.  This was clearly marked on the skin.  A preoperative timeout was performed confirming the patient's identity and the nature and location of the operation to be performed.    The right breast was prepped with alcohol.  4 mL of lymphazurin blue dye were injected into the subareolar plexus.  The breast was then massaged for 5 minutes to facilitate lymphatic uptake.    The breast was then prepped and draped in the usual sterile fashion using chlorhexidine.  The planned incision was marked on the skin in a crescentic axillary fashion, including the skin directly overlying the palpable mass.  The planned incision and underlying breast tissue were anesthetized with a 1:1 mixture of 1% lidocaine plain and 0.25% bupivicaine plain as a field block.  The incision was then made sharply and continued into the subcutaneous tissue with electrocautery.  The targeted breast tissue was dissected free from the surrounding breast tissue using electrocautery.  The posterior margin included the pectoralis fascia.  The specimen was then oriented using Correct[employer] and was submitted for specimen radiograph.  This demonstrated capture of the mass and biopsy marker.  Radiographic margins appeared adequate in all orientations by two orthogonal views.  The specimen was then submitted to pathology, who also reported grossly negative margins.  The incision was inspected for hemostasis, which was achieved with electrocautery.  The wound was then irrigated thoroughly with warm sterile saline; the effluent was clear.  The excision cavity was then marked with titanium hemoclips.        Attention was then turned to the axilla, which was accessible through the breast incision.  The clavipectoral fascia was identified at the lateral border of the pectoralis major and was divided with electrocautery.  The gamma probe was used to identify areas of radioactivity.  A total of 2 sentinel nodes  were identified and dissected free from the surrounding axillary tissue using electrocautery, as follows:  [place] node #1: hot (4484 counts per second of ex vivo radioactivity), blue, level 1 of the axilla.  [place] node #2: hot (2173 counts per second of ex vivo radioactivity), blue, level 2 of the axilla.  All of these sentinel nodes that were excised were deep axillary nodes, below the clavipectoral fascia. The background radioactivity count of the axilla was 0 counts per second.  The incision was then inspected for hemostasis, which was achieved with electrocautery.  The wound was then irrigated thoroughly with warm sterile saline.  The effluent was clear.  The breast tissue was then gently reapproximated using interrupted 2-0 Vicryl sutures.  The skin was closed with interrupted 3-0 Monocryl deep dermal sutures and a 4-0 Monocryl running subcuticular suture.A sterile dressing was applied to the incision.    The patient was then awakened from General anesthesia via laryngeal mask airway and was taken to the postanesthesia care unit in stable condition.  At the conclusion of the operation all sponge, needle, and instrument counts were reported correct x2 by the nursing staff.  Estimated blood loss was 5 mL.  The patient tolerated the procedure well with no immediate complications.  It is anticipated that the patient will be able to be discharged to home when PACU discharge criteria are met.    [place] Node Biopsy for Breast Cancer - Right  Operation performed with curative intent. Yes   Tracer(s) used to identify sentinel nodes in the upfront surgery (non-neoadjuvant) setting (select all that apply). Dye and Radioactive tracer   Tracer(s) used to identify sentinel nodes in the neoadjuvant setting (select all that apply). N/A   All nodes (colored or non-colored) present at the end of a dye-filled lymphatic channel were removed. Yes   All significantly radioactive nodes were removed. Yes   All palpably  suspicious nodes were removed. N/A   Biopsy-proven positive nodes marked with clips prior to chemotherapy were identified and removed. N/A

## 2025-03-14 NOTE — DISCHARGE INSTRUCTIONS
If any questions arise, call your provider Dr. Diaz [phone].    If your provider is not available, please feel free to call the Surgical Center at [phone]. A nurse from the surgery center will call you on Monday and see how you are doing after your procedure.     MEDICATIONS: Resume taking daily medication.  Take prescribed pain medication with food.  If no medication is prescribed, you may take non-aspirin pain medication if needed.  PAIN MEDICATION CAN BE VERY CONSTIPATING.  Take a stool softener or laxative such as senokot, pericolace, or milk of magnesia if needed.    Last pain medication given:     Tylenol 1,000 mg at 12:30 AM. If needed the next time you can take Tylenol is after 6:30PM.  If needed you may take Ibuprofen at any time.     What to Expect Post Anesthesia    Rest and take it easy for the first 24 hours.  A responsible adult is recommended to remain with you during that time.  It is normal to feel sleepy.  We encourage you to not do anything that requires balance, judgment or coordination.    FOR 24 HOURS DO NOT:  Drive, operate machinery or run household appliances.  Drink beer or alcoholic beverages.  Make important decisions or sign legal documents.    To avoid nausea, slowly advance diet as tolerated, avoiding spicy or greasy foods for the first day.  Add more substantial food to your diet according to your provider's instructions.  Babies can be fed formula or breast milk as soon as they are hungry.  INCREASE FLUIDS AND FIBER TO AVOID CONSTIPATION.    MILD FLU-LIKE SYMPTOMS ARE NORMAL.  YOU MAY EXPERIENCE GENERALIZED MUSCLE ACHES, THROAT IRRITATION, HEADACHE AND/OR SOME NAUSEA.

## 2025-03-14 NOTE — ANESTHESIA TIME REPORT
Anesthesia Start and Stop Event Times       Date Time Event    3/14/2025 1241 Ready for Procedure     1257 Anesthesia Start     1420 Anesthesia Stop          Responsible Staff  03/14/25      Name Role Begin End    Marlin East M.D. Anesth 1257 1420          Overtime Reason:  no overtime (within assigned shift)    Comments:

## 2025-03-14 NOTE — OR NURSING
1418 Pt arrived from OR., report received. Connected to monitor, VSS. On 8L mask.  Dressings to right breast CDI.     1437 Patient son in law Denton updated via telephone, he is at home but only lives 10 minutes away so we will call him when she is closer to discharge.     1501 patient has no pain or nausea,tolerating room air, would just like to rest for now.     1515 Patient tolerating sips of water. LVM for son in law Denton that he can start heading this way. Patient has no pain or nausea.     1524 Report given to Chiara WADSWORTH.

## 2025-03-14 NOTE — ANESTHESIA POSTPROCEDURE EVALUATION
Patient: Suzy Vazquez    Procedure Summary       Date: 03/14/25 Room / Location: [facility] ROOM 28 / SURGERY SAME DAY [facility]    Anesthesia Start: 1257 Anesthesia Stop: 1420    Procedures:       RIGHT PARTIAL MASTECTOMY, RIGHT SENTINEL LYMPH NODE INJECTION AND BIOPSY (Right: Breast)      BIOPSY, LYMPH NODE, SENTINEL (Right: Breast) Diagnosis: (RIGHT BREAST CANCER)    Surgeons: Christi Lopez M.D. Responsible Provider: Marlin East M.D.    Anesthesia Type: general ASA Status: 2            Final Anesthesia Type: general  Last vitals  BP   Blood Pressure: 104/56    Temp   36.8 °C (98.2 °F)    Pulse   76   Resp   14    SpO2   95 %      Anesthesia Post Evaluation    Patient location during evaluation: PACU  Patient participation: complete - patient participated  Level of consciousness: awake and alert  Pain score: 0    Airway patency: patent  Anesthetic complications: no  Cardiovascular status: hemodynamically stable  Respiratory status: acceptable  Hydration status: euvolemic    PONV: none          No notable events documented.     Nurse Pain Score: 0 (NPRS)

## 2025-03-16 ENCOUNTER — PATIENT MESSAGE (OUTPATIENT)
Dept: GYNECOLOGY | Facility: CLINIC | Age: 60
End: 2025-03-16
Payer: COMMERCIAL

## 2025-03-16 DIAGNOSIS — Z11.3 SCREENING EXAMINATION FOR STI: ICD-10-CM

## 2025-03-17 ENCOUNTER — RESULTS FOLLOW-UP (OUTPATIENT)
Dept: SURGERY | Facility: MEDICAL CENTER | Age: 60
End: 2025-03-17
Payer: COMMERCIAL

## 2025-03-17 ENCOUNTER — TELEPHONE (OUTPATIENT)
Dept: SURGERY | Facility: MEDICAL CENTER | Age: 60
End: 2025-03-17
Payer: COMMERCIAL

## 2025-03-17 NOTE — TELEPHONE ENCOUNTER
Patient is doing well postoperatively with minimal pain, relieved with Tramadol and Tylenol. She denies any fevers, chills, sweats, N/V. I reminded her of her postop appointment scheduled for 03/20/2025 at 10:40am. We discussed that Dr. Diaz will mychart message her with her pathology results once those pend and I will make appropriate referrals at her postop visit. I advised her to continue wearing her compression sports bra to prevent excess swelling, pain, or bruising until her post op appointment. I informed her that she can shower beginning 48 hours after surgery and should continue daily showers thereafter. All patient questions answered.

## 2025-03-18 ENCOUNTER — HOSPITAL ENCOUNTER (OUTPATIENT)
Dept: HEMATOLOGY ONCOLOGY | Facility: MEDICAL CENTER | Age: 60
End: 2025-03-18
Attending: STUDENT IN AN ORGANIZED HEALTH CARE EDUCATION/TRAINING PROGRAM
Payer: COMMERCIAL

## 2025-03-18 ENCOUNTER — PATIENT MESSAGE (OUTPATIENT)
Dept: GYNECOLOGY | Facility: CLINIC | Age: 60
End: 2025-03-18
Payer: COMMERCIAL

## 2025-03-18 VITALS
OXYGEN SATURATION: 97 % | BODY MASS INDEX: 20.21 KG/M2 | DIASTOLIC BLOOD PRESSURE: 78 MMHG | SYSTOLIC BLOOD PRESSURE: 126 MMHG | HEART RATE: 83 BPM | WEIGHT: 133.38 LBS | HEIGHT: 68 IN | TEMPERATURE: 98.1 F

## 2025-03-18 DIAGNOSIS — Z17.0 MALIGNANT NEOPLASM OF AXILLARY TAIL OF RIGHT BREAST IN FEMALE, ESTROGEN RECEPTOR POSITIVE (HCC): ICD-10-CM

## 2025-03-18 DIAGNOSIS — C50.611 MALIGNANT NEOPLASM OF AXILLARY TAIL OF RIGHT BREAST IN FEMALE, ESTROGEN RECEPTOR POSITIVE (HCC): ICD-10-CM

## 2025-03-18 PROCEDURE — 99212 OFFICE O/P EST SF 10 MIN: CPT | Performed by: STUDENT IN AN ORGANIZED HEALTH CARE EDUCATION/TRAINING PROGRAM

## 2025-03-18 PROCEDURE — 99214 OFFICE O/P EST MOD 30 MIN: CPT | Performed by: STUDENT IN AN ORGANIZED HEALTH CARE EDUCATION/TRAINING PROGRAM

## 2025-03-18 ASSESSMENT — FIBROSIS 4 INDEX: FIB4 SCORE: 0.82

## 2025-03-18 NOTE — PROGRESS NOTES
54-55     Brother  with prostate cancer on ADT and stopped   Dad prostate  Mom breast cancer stage IV  Brother alcohol cirrhosis  Paternal grandmother with brain lesion unsure if malignancy     Collagen creatine protein powder   [facility] memory boost     Takes supplements will send labels    Has had de   metastatic disease.     She feels well and has been healing well postoperatively.  No acute complaints, pain is controlled, no right arm swelling or difficulties with range of motion.     She underwent menopause around 54-55.     She has significant family history with a brother who was previously diagnosed with prostate cancer and was on ADT, dad also with prostate cancer, mom with metastatic breast cancer, paternal grandmother with brain mass but unsure of primary or metastatic disease.  Also had a brother with alcoholic cirrhosis    Her supplements include collagen and creatine powder and a supplement for memory.    Past Medical History:   Diagnosis Date    Allergy     Anxiety     Asthma     not currently    Cancer (HCC) 02/2025    breast    Lyme disease     Urinary tract infection      Past Surgical History:   Procedure Laterality Date    HEMORRHOIDECTOMY  4/3/2025    Procedure: EXCISIONAL HEMORRHOIDECTOMY, INTERNAL/EXTERNAL,1 PEDICLES;  Surgeon: Tammy Cleary M.D.;  Location: SURGERY [facility];  Service: General    WA PUNCTURE DRAINAGE OF LESION  3/27/2025    WA PUNCTURE DRAINAGE OF LESION  3/20/2025    PB MASTECTOMY, PARTIAL Right 3/14/2025    Procedure: RIGHT PARTIAL MASTECTOMY, RIGHT SENTINEL LYMPH NODE INJECTION AND BIOPSY;  Surgeon: Christi Lopez M.D.;  Location: SURGERY SAME DAY [facility];  Service: General    NODE BIOPSY SENTINEL Right 3/14/2025    Procedure: BIOPSY, LYMPH NODE, SENTINEL;  Surgeon: Christi Lopez M.D.;  Location: SURGERY SAME DAY [facility];  Service: General    DILATION AND CURETTAGE      OTHER      nose  surgery    PRIMARY C SECTION      US-NEEDLE CORE BX-BREAST PANEL      The cyst popped when biopsy attempted     Social History     Socioeconomic History    Marital status: Single     Spouse name: Not on file    Number of children: Not on file    Years of education: Not on file    Highest education level: Not on file   Occupational History    Not on file    Tobacco Use    Smoking status: Never    Smokeless tobacco: Never   Vaping Use    Vaping status: Never Used   Substance and Sexual Activity    Alcohol use: Not Currently     Comment: Quit 2024    Drug use: Never    Sexual activity: Yes   Other Topics Concern    Not on file   Social History Narrative    Not on file     Social Drivers of Health     Financial Resource Strain: Not on file   Food Insecurity: Not on file   Transportation Needs: Not on file   Physical Activity: Not on file   Stress: Not on file   Social Connections: Not on file   Intimate Partner Violence: Not on file   Housing Stability: Not on file     Family History   Problem Relation Age of Onset    Breast Cancer Mother 59    Cancer Mother         Breadt cancer    Prostate cancer Father 50    Cancer Father         Prostate    Prostate cancer Brother 58       OB History    Para Term  AB Living   5 3 3  1 4   SAB IAB Ectopic Molar Multiple Live Births              # Outcome Date GA Lbr Joseph/2nd Weight Sex Type Anes PTL Lv   5       CS-Unspec      4 Term      Vag-Spont      3 Term      Vag-Spont      2 Term      Vag-Spont      1 AB               Obstetric Comments   Age at first delivery:29   Menarche:16   LMP:55   Combined: 5yrs       Allergies as of 2025 - Reviewed 2025   Allergen Reaction Noted    Sulfa drugs Hives 2023    Sulfamethoxazole Hives 2009       Current Outpatient Medications:     anastrozole (ARIMIDEX) 1 MG Tab, Take 1 Tablet by mouth every day., Disp: 90 Tablet, Rfl: 1    estradiol (VAGIFEM) 10 MCG Tab, PLACE 1 INSERT VAGINALLY TWICE  WEEKLY (Patient taking differently: Insert 10 mcg into the vagina.), Disp: 24 Tablet, Rfl: 3    progesterone (PROMETRIUM) 200 MG capsule, Take 200 mg by mouth every day., Disp: , Rfl:     gabapentin (NEURONTIN) 300 MG Cap, Take 1 Capsule by mouth 3 times a day for 5 days., Disp: 15 Capsule, Rfl: 0    Review of Systems:  ROS as above    Physical Exam:  Vitals:     "03/18/25 1521   BP: 126/78   BP Location: Left arm   Patient Position: Sitting   BP Cuff Size: Adult   Pulse: 83   Temp: 36.7 °C (98.1 °F)   TempSrc: Temporal   SpO2: 97%   Weight: 60.5 kg (133 lb 6.1 oz)   Height: 1.727 m (5' 7.99\")       DESC; KARNOFSKY SCALE WITH ECOG EQUIVALENT: 100, Fully active, able to carry on all pre-disease performed without restriction (ECOG equivalent 0)    DISTRESS LEVEL: no apparent distress    Physical Exam  Constitutional:       General: She is not in acute distress.     Appearance: Normal appearance. She is not toxic-appearing.   HENT:      Head: Normocephalic and atraumatic.      Right Ear: External ear normal.      Left Ear: External ear normal.      Mouth/Throat:      Mouth: Mucous membranes are moist.      Pharynx: Oropharynx is clear. No oropharyngeal exudate.   Eyes:      General: No scleral icterus.     Conjunctiva/sclera: Conjunctivae normal.   Cardiovascular:      Pulses: Normal pulses.   Pulmonary:      Effort: Pulmonary effort is normal. No respiratory distress.   Chest:      Comments: Right breast incision C/D/I.  No masses, lesions, skin changes noted, no axillary lymphadenopathy palpated bilaterally  Abdominal:      General: There is no distension.      Palpations: Abdomen is soft. There is no mass.      Tenderness: There is no abdominal tenderness.   Musculoskeletal:      Cervical back: Neck supple. No tenderness.   Skin:     General: Skin is warm and dry.   Neurological:      Mental Status: She is alert and oriented to person, place, and time. Mental status is at baseline.   Psychiatric:         Mood and Affect: Mood normal.         Thought Content: Thought content normal.         Judgment: Judgment normal.        Labs:  Lab Results   Component Value Date/Time    WBC 6.4 03/10/2025 11:25 AM    RBC 4.57 03/10/2025 11:25 AM    HEMOGLOBIN 14.4 03/10/2025 11:25 AM    HEMATOCRIT 43.7 03/10/2025 11:25 AM    MCV 95.6 03/10/2025 11:25 AM    MCH 31.5 03/10/2025 11:25 AM    " MCHC 33.0 03/10/2025 11:25 AM    MPV 10.3 03/10/2025 11:25 AM    NEUTSPOLYS 46.70 05/20/2021 09:06 AM    LYMPHOCYTES 37.30 05/20/2021 09:06 AM    MONOCYTES 11.80 05/20/2021 09:06 AM    EOSINOPHILS 3.30 05/20/2021 09:06 AM    BASOPHILS 0.90 05/20/2021 09:06 AM      Lab Results   Component Value Date/Time    SODIUM 137 03/10/2025 11:25 AM    POTASSIUM 4.6 03/10/2025 11:25 AM    CHLORIDE 103 03/10/2025 11:25 AM    CO2 24 03/10/2025 11:25 AM    GLUCOSE 94 03/10/2025 11:25 AM    BUN 18 03/10/2025 11:25 AM    CREATININE 0.98 03/10/2025 11:25 AM      Imaging:   Screening:  No results found for this or any previous visit from the past 365 days.      Diagnostic:    MA-MAMMO CONFIRMATION LEFT 03/12/2025    Addendum 3/13/2025  4:27 PM  Pathology results of left breast mass at the 11:00, 8 cm from the nipple show breast parenchyma with focal area suggestive of a benign fibroadenoma. Results are benign and concordant. Surgical referral for newly diagnosed right breast cancer is  recommended.    Narrative  3/12/2025 2:00 PM    HISTORY/REASON FOR EXAM:  Abnormal imaging study; Left MRI findings consistent with likely intramammary lymph node versus fibroadenoma, 2nd look US with intent to biopsy      COMPARISON:  Ultrasound March 11, 2025 and MRI February 26, 2025    TECHNIQUE/EXAM DESCRIPTION AND NUMBER OF VIEWS:  Left breast ultrasound-guided 14-gauge core biopsy.    The procedure was discussed with the patient. Risks, benefits, and alternatives were discussed. Informed written consent was obtained. A timeout was performed.    The circumscribed oval mass in the left breast 11:00 position 8 cm from nipple was targeted from an inferior approach. The biopsy tract was anesthetized with 8 mL of 1% lidocaine with sodium bicarbonate after the site was draped and prepared in the usual  sterile fashion. A superficial dermatotomy was made.    5 cores were obtained using a 14-gauge Bard biopsy device. A EasyPaint open coil clip marker was  placed under ultrasound guidance at the biopsy site.    Two-view left mammogram was obtained following clip placement.    Hemostasis was obtained with direct pressure. There were no apparent complications.    FINDINGS: Ultrasound images obtained during the biopsy demonstrate the needle within the mass on all 5 attempts. Final ultrasound image demonstrates the clip within the targeted mass.    Postbiopsy CC and ML views demonstrate the biopsy clip in the superior posterior breast. It is not appreciated on the CC view due to the posterior medial location. No significant postbiopsy hematoma identified.    Impression  1.  Ultrasound guided 14-gauge core biopsy of benign-appearing mass in the left breast 11:00 position likely representing an intramammary lymph node versus fibroadenoma.      US-BREAST LIMITED-LEFT 03/11/2025    Narrative  3/11/2025 8:13 AM    HISTORY/REASON FOR EXAM:  Abnormal breast imaging study; Left MRI findings consistent with likely fibroadenoma versus lymph node, 2nd look US with intent to biopsy. Biopsy proven right breast cancer      TECHNIQUE/EXAM DESCRIPTION AND NUMBER OF VIEWS:  Left breast ultrasound.    COMPARISON:   MRI February 26, 2025 and mammogram January 27, 2025    FINDINGS:      Targeted ultrasound evaluation of the area of concern with both static and real-time imaging myself in the room demonstrates an oval mass in the left breast 11:00 position 8 cm from nipple. The mass measures 9 x 3 x 7 mm. It is mixed  echogenicity with question of a fatty hilum suggestive of an intramammary lymph node though no obvious feeding vessel is identified. It is benign in appearance without shadowing or vascularity. It is wider than tall.    Ultrasound evaluation of the left axilla reveals no abnormal lymph nodes.    Impression  1.  Oval circumscribed mass in the left breast 11:00 position which appears to correspond to the MR finding likely representing intramammary lymph node versus  fibroadenoma.    2.  Recommend ultrasound-guided biopsy for definitive diagnosis.    3.  The patient met with the  to discuss scheduling the biopsy.    These results were given to the patient at the time of visit.    BI-RADS Category: R4 - CATEGORY 4: SUSPICIOUS FINDING - (4A-FINDING NEEDING INTERVENTION WITH A LOW SUSPICION FOR MALIGNANCY.)       MR-BREAST-BILATERAL-WITH & W/O 02/26/2025    Narrative  2/26/2025 1:07 PM    HISTORY/REASON FOR EXAM:  New diagnosis right axillary tail IDC, eval extent of disease.      TECHNIQUE/EXAM DESCRIPTION:  MRI of the breast, bilateral without and with dynamic IV gadolinium enhancement.    MR imaging of the bilateral breasts was performed on a Lucina 3.0 Annmarie scanner using a dedicated breast coil with the patient in the prone position, with axial T1, axial fat-suppressed T2, and bolus dynamic intravenous gadolinium enhanced  fat-suppressed 3D GRE sequences at precontrast, 30 second, 2 minute, 3 minute and 5 minute delays. Post processing subtraction images were obtained. Delayed postcontrast sagittal images also obtained.    20 mL ProHance contrast was administered intravenously.    COMPARISON:  January 31, 2025, January 27, 2025    FINDINGS:  Background parenchymal enhancement is mild and symmetric.    Right breast: The breast parenchyma is heterogeneously dense. There are scattered cysts identified. There is retroareolar duct ectasia. Biopsy clip identified in the far lateral right axillary tail. There is associated mass slightly obscured by the clip.  The mass measures approximately 5 x 4 x 8 mm. No other enhancing mass or suspicious area of enhancement identified.    Left breast: The breast parenchyma is heterogeneously dense. There are scattered cysts identified. There is retroareolar duct ectasia. There is a circumscribed oval T2 bright mass in the far posterior medial aspect of the superior breast. It measures 7 x  5 x 8 mm. It demonstrates minimal  progressively increasing enhancement. This likely represents a benign process such as a lymph node or fibroadenoma. No other enhancing mass or suspicious area of enhancement identified.    Lymph nodes: No axillary lymphadenopathy identified. The axillary lymph nodes are symmetric. No internal mammary chain lymphadenopathy identified.    Upper abdomen: Unremarkable.    Bony structures: Unremarkable.    Anterior mediastinum. There is evidence of a large right thyroid gland nodule is identified on previous outside ultrasound with previous FNA in February 2023.    Impression  1.  5 x 4 x 8 mm enhancing mass adjacent biopsy clip in the far lateral right axillary tail corresponding with biopsy-proven cancer.    2.  Oval, circumscribed, T2 bright enhancing mass in the superior medial posterior left breast potentially representing intramammary lymph node or fibroadenoma. Recommend MR directed ultrasound for further evaluation and for possible biopsy. If no mass  identified with ultrasound then would recommend follow-up breast MRI in 6 months. Due to the posterior medial location it is likely not amenable MR guided biopsy.    BI-RADS Category: R6 - CATEGORY 6: KNOWN BIOPSY-PROVEN MALIGNANCY - APPROPRIATE ACTION SHOULD BE TAKEN        Pathology:  Pathology Results (Last result in 90 days)                04/03/25 1407  Narrative:                                                                                                                   Pathology Specimen  [facility]                          DEPARTMENT OF PATHOLOGY                   [address] [place] [address]  [id number]              Phone [phone]          Fax [phone]  Dianna Barrera M.D.     Tali Dugan M.D.     Romy Swanson D.O.                            DRU Voss M.D.     Shun Johnson M.D.     MEDHAT Ellison.O.    Patient:    MICHELLEGISELLA         "Specimen    [id number]                                           #:      Copies to:                        SURGICAL PATHOLOGY CONSULTATION      FINAL DIAGNOSIS:    A. Anterior midline hemorrhoid:         Benign squamous and glandular mucosa with focal acute          inflammation and overlying ectatic blood vessels consistent          with hemorrhoids.                                        Diagnosis performed by:                                      KASI YORK MD  ------------------------------------------------------------------------  ---------------------------------------------------------------  CODES: [id number]  [id number]    SPECIMEN(S)  A. Anterior midline hemorrhoid:    PREOPERATIVE DIAGNOSIS:  Hemorrhoids    POSTOPERATIVE DIAGNOSIS:  Hemorrhoids       GROSS DESCRIPTION:  A. Received in formalin labeled with the patient's name, medical record  number, and \"anterior midline hemorrhoid\" is a 3.6 cm in maximum  dimension irregular, cauterized, disrupted tissue fragment.  Sectioning  reveals no discrete evidence of malignancy. RS 1 /[id number] [facility]    MICROSCOPIC DESCRIPTION:  Microscopic examination was performed.  Please see diagnosis.    Report electronically signed by:  KASI YORK MD ,  Diagnosis performed at: [facility]  Pathology  Department, [address] [place] [address]  [id number]      Printed 00/00/0000 [id number] SUZY MARTINEZ   Page 1 of 1 MRN#:[id number]                   Assessment & Plan:    Suzy Martinez is a 59 y.o. female with PMH who presents today to establish care for Clinical Stage IA [id number] Pathologic stage IA pT1 cN0 M0 grade 1 IDC right breast ER +91 to 100% VA +91 to 100% HER2 0+ IHC Ki-67 10%    Treatment Plan: discussed patients pathology results including staging at length with them today and reviewed NCCN guidelines.  Shared decision making model employed during duration of this visit.  Given 4.5 mm of disease no indication for adjuvant chemotherapy.  Given " strongly HR + we will plan for adjuvant endocrine therapy with anastrozole.  She will establish with radiation oncology to discuss adjuvant radiation after which she will start therapy.  Discussed side effects with her today, timing and handouts provided.  Will plan for 5 years of therapy.  DEXA scan ordered by Dr. Farris will assess if she needs bone modifying medications.     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 Thank you for allowing me to participate in the care for this patient. Please feel free to contact me for any questions or concerns.

## 2025-03-19 ENCOUNTER — PATIENT MESSAGE (OUTPATIENT)
Dept: GYNECOLOGY | Facility: CLINIC | Age: 60
End: 2025-03-19
Payer: COMMERCIAL

## 2025-03-19 NOTE — PROGRESS NOTES
"    Subjective    Delightful 59F s/p Right Partial Mastectomy, SLNBx on 03/14/2025, here for postop check and review of pathology. She reports that she has been doing well with pain, until yesterday she began feeling \"flu-like\" and felt pressure build up in her axillary area. She met with Dr. Sorensen on Tuesday. Functionally, the patient is at baseline and her ROM is excellent.    Pathology  Right breast 10:00 IDC, tubular subtype, 4.5mm, 0/4 Lymph nodes positive, margins negative. The patient was given a printed copy of her pathology report and it was discussed with her in detail.     Objective    /76 (BP Location: Left arm, Patient Position: Sitting, BP Cuff Size: Large adult)   Pulse 82   Temp 36.3 °C (97.4 °F) (Temporal)   Ht 1.727 m (5' 8\")   Wt 60.8 kg (134 lb)   SpO2 98%   BMI 20.37 kg/m²   Gen: Alert, oriented, pleasant, no acute distress  Chest: Respirations unlabored on room air with symmetric expansion  Abd: Soft, nondistended  Ext: Warm, well-perfused  Breasts: Right breast UOQ radial incision clean, dry, intact. No erythema. No exudate. No palpable masses. Mild visible palpable fluctuance. No ecchymosis. Expected contour. Bedside ultrasound was performed with the GE 12mHz linear probe that showed a hypoechoic fluid collection beneath her incision, indicative of a seroma.     [facility] ECOG Performance Status categories: 0= Fully active, able to carry on all pre-disease performance without restriction.    Assessment & Plan    Delightful 59F s/p R PM, SLNBx for IDC, overall doing well postoperatively. We reviewed her pathology results and the next steps in detail; Established with medical oncology, who referred her to radiation oncology already. I aspirated her seroma in office today (see procedure note). I advised her to continue wearing compression diligently using an ACE wrap, which I provided today. All questions were answered in detail.   - Referrals: Established with medical oncology, " radiation oncology (placed by Dr. Sorensen)   - Return to clinic: 2 week seroma check   - Next clinical breast exam: 07/2025, 01/2026, 07/2026, 01/2027   - Next breast imaging: B dMMG 01/2026, MRI 07/2026, B sMMG 01/2027    Problem   Malignant Neoplasm of Axillary Tail of Right Breast in Female, Estrogen Receptor Positive (Hcc)    Right axillary tail IDC, G1, ER+WV+HER2-, Ki67 10%, [id number] (jak/prog [place])  - US-guided CNBx 01/31/2025  - MRI pending  - R PM, SLNBx 03/14/2025 (Emily)    - Seroma aspirated 03/20/2025 (Brooke)  - Med onc appt 03/18/2025 ([place])  - Pending referral to rad onc ([place])        Cancer Staging   Malignant neoplasm of axillary tail of right breast in female, estrogen receptor positive (HCC)  Staging form: Breast, AJCC 8th Edition  - Clinical stage from 2/18/2025: Stage IA (cT1b, cN0, cM0, G1, ER+, WV+, HER2-) - Signed by Christi Lopez M.D. on 2/18/2025       Brooke Miles PA-C

## 2025-03-20 ENCOUNTER — APPOINTMENT (OUTPATIENT)
Dept: URBAN - METROPOLITAN AREA CLINIC 20 | Facility: CLINIC | Age: 60
Setting detail: DERMATOLOGY
End: 2025-03-20

## 2025-03-20 ENCOUNTER — OFFICE VISIT (OUTPATIENT)
Dept: SURGERY | Facility: MEDICAL CENTER | Age: 60
End: 2025-03-20
Payer: COMMERCIAL

## 2025-03-20 VITALS
DIASTOLIC BLOOD PRESSURE: 76 MMHG | TEMPERATURE: 97.4 F | HEART RATE: 82 BPM | OXYGEN SATURATION: 98 % | BODY MASS INDEX: 20.31 KG/M2 | WEIGHT: 134 LBS | HEIGHT: 68 IN | SYSTOLIC BLOOD PRESSURE: 126 MMHG

## 2025-03-20 DIAGNOSIS — Z41.9 ENCOUNTER FOR PROCEDURE FOR PURPOSES OTHER THAN REMEDYING HEALTH STATE, UNSPECIFIED: ICD-10-CM

## 2025-03-20 DIAGNOSIS — N64.89 SEROMA OF BREAST: ICD-10-CM

## 2025-03-20 DIAGNOSIS — C50.611 MALIGNANT NEOPLASM OF AXILLARY TAIL OF RIGHT BREAST IN FEMALE, ESTROGEN RECEPTOR POSITIVE (HCC): ICD-10-CM

## 2025-03-20 DIAGNOSIS — Z17.0 MALIGNANT NEOPLASM OF AXILLARY TAIL OF RIGHT BREAST IN FEMALE, ESTROGEN RECEPTOR POSITIVE (HCC): ICD-10-CM

## 2025-03-20 PROCEDURE — ? BOTOX

## 2025-03-20 PROCEDURE — 10160 PNXR ASPIR ABSC HMTMA BULLA: CPT | Mod: 78

## 2025-03-20 PROCEDURE — 99024 POSTOP FOLLOW-UP VISIT: CPT | Mod: 25

## 2025-03-20 ASSESSMENT — FIBROSIS 4 INDEX: FIB4 SCORE: 0.82

## 2025-03-20 NOTE — PROCEDURE: BOTOX
Inferior Lateral Orbicularis Oculi Units: 0
Detail Level: Detailed
Show Masseter Units: Yes
Consent: Written consent obtained. Risks include but not limited to lid/brow ptosis, bruising, swelling, diplopia, temporary effect, incomplete chemical denervation.
Post-Care Instructions: Patient instructed to not lie down for 4 hours and limit physical activity for 24 hours. Patient instructed not to travel by airplane for 48 hours.
Forehead Units: 15
Show Ucl Units: No
Glabellar Complex Units: 25
Additional Area 3 Location: masseter
Expiration Date (Month Year): 02/2027
Additional Area 1 Location: upper lip
Lot #: [id number]
Incrementing Botox Units: By 0.5 Units
Dilution (U/0.1 Cc): 0.1
Additional Area 2 Location: chin
Periorbital Skin Units: 10

## 2025-03-20 NOTE — PROCEDURES
The indications, alternatives, risks, and expected outcomes of seroma aspiration was held with the patient. All questions answered in detail. Verbal consent obtained. The right axilla was prepped with alcohol. No local anaesthesia was needed due to the incision being insensate. An 18g needle was used to aspirate and drain ~15cc of serous fluid from the cavity, with complete collapse of the cavity, from an anterior approach. A Band-aid was applied. The patient tolerated the procedure well with no apparent complications. Estimated blood loss <1 mL.  I will see her back in 2 weeks for a recheck.      TN PUNCTURE DRAINAGE OF LESION [12512 (CPT®)]

## 2025-03-21 RX ORDER — ANASTROZOLE 1 MG/1
1 TABLET ORAL DAILY
Qty: 90 TABLET | Refills: 1 | Status: SHIPPED | OUTPATIENT
Start: 2025-03-21

## 2025-03-26 NOTE — PROGRESS NOTES
"    Subjective    Delightful 59F s/p Right Partial Mastectomy, SLNBx on 03/14/2025, here for seroma check. She reports that her axillary area is extremely swollen and she has felt sick over the last week, especially after she exercises. Functionally, the patient is at baseline. She has an appointment with radiation oncology on 04/10/2025.    Objective    /78 (BP Location: Left arm, Patient Position: Sitting, BP Cuff Size: Large adult)   Pulse 63   Temp 35.9 °C (96.7 °F) (Temporal)   Ht 1.727 m (5' 8\")   Wt 59 kg (130 lb)   SpO2 97%   BMI 19.77 kg/m²   Gen: Alert, oriented, pleasant, no acute distress  Chest: Respirations unlabored on room air with symmetric expansion  Abd: Soft, nondistended  Ext: Warm, well-perfused  Breasts:  Right breast UOQ radial incision clean, dry, intact. No erythema. No exudate. No palpable masses. Moderate palpable fluctuance, likely recurrent seroma. No ecchymosis.     [facility] [place] Performance Status categories: 0= Fully active, able to carry on all pre-disease performance without restriction.    Assessment & Plan    Delightful 59F s/p R PM, SLNBx for IDC, overall doing well postoperatively. I aspirated her seroma in office today (see procedure note). I advised her to continue using compression as tolerated and gave her a gauze fluff for the axillary area. We discussed that a travel pillow in that area might be comfortable for her. I advised her to continue to be mindful of her arm movements to help reduce fluid buildup. All questions were answered in detail.   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 on 2/18/2025       Brooke Miles PA-C

## 2025-03-27 ENCOUNTER — OFFICE VISIT (OUTPATIENT)
Dept: SURGERY | Facility: MEDICAL CENTER | Age: 60
End: 2025-03-27
Payer: COMMERCIAL

## 2025-03-27 VITALS
BODY MASS INDEX: 19.7 KG/M2 | HEART RATE: 63 BPM | TEMPERATURE: 96.7 F | HEIGHT: 68 IN | DIASTOLIC BLOOD PRESSURE: 78 MMHG | SYSTOLIC BLOOD PRESSURE: 134 MMHG | WEIGHT: 130 LBS | OXYGEN SATURATION: 97 %

## 2025-03-27 DIAGNOSIS — N64.89 SEROMA OF BREAST: ICD-10-CM

## 2025-03-27 DIAGNOSIS — C50.611 MALIGNANT NEOPLASM OF AXILLARY TAIL OF RIGHT BREAST IN FEMALE, ESTROGEN RECEPTOR POSITIVE (HCC): ICD-10-CM

## 2025-03-27 DIAGNOSIS — Z17.0 MALIGNANT NEOPLASM OF AXILLARY TAIL OF RIGHT BREAST IN FEMALE, ESTROGEN RECEPTOR POSITIVE (HCC): ICD-10-CM

## 2025-03-27 PROCEDURE — 10160 PNXR ASPIR ABSC HMTMA BULLA: CPT | Mod: 78

## 2025-03-27 PROCEDURE — 3075F SYST BP GE 130 - 139MM HG: CPT

## 2025-03-27 PROCEDURE — 99024 POSTOP FOLLOW-UP VISIT: CPT | Mod: 25

## 2025-03-27 PROCEDURE — 3078F DIAST BP <80 MM HG: CPT

## 2025-03-27 ASSESSMENT — FIBROSIS 4 INDEX: FIB4 SCORE: 0.82

## 2025-03-27 NOTE — PROCEDURES
The indications, alternatives, risks, and expected outcomes of seroma aspiration was held with the patient. All questions answered in detail. Verbal consent obtained. The right axilla was prepped with alcohol. No local anaesthesia was needed due to the incision being insensate. Using Ultrasound guidance, an 18g needle was used to aspirate and drain ~65cc of serous fluid from the cavity, with complete collapse of the cavity, from an anterior approach. A Band-aid was applied. The patient tolerated the procedure well with no apparent complications. Estimated blood loss <1 mL.  I will see her back next week for a recheck.      SC PUNCTURE DRAINAGE OF LESION [29140 (CPT®)]

## 2025-04-02 ENCOUNTER — APPOINTMENT (OUTPATIENT)
Dept: URBAN - METROPOLITAN AREA CLINIC 20 | Facility: CLINIC | Age: 60
Setting detail: DERMATOLOGY
End: 2025-04-02

## 2025-04-02 ENCOUNTER — OFFICE VISIT (OUTPATIENT)
Dept: SURGERY | Facility: MEDICAL CENTER | Age: 60
End: 2025-04-02
Payer: COMMERCIAL

## 2025-04-02 VITALS
DIASTOLIC BLOOD PRESSURE: 78 MMHG | TEMPERATURE: 97.2 F | BODY MASS INDEX: 19.85 KG/M2 | SYSTOLIC BLOOD PRESSURE: 139 MMHG | HEIGHT: 68 IN | OXYGEN SATURATION: 96 % | HEART RATE: 87 BPM | WEIGHT: 131 LBS

## 2025-04-02 DIAGNOSIS — Z17.0 MALIGNANT NEOPLASM OF AXILLARY TAIL OF RIGHT BREAST IN FEMALE, ESTROGEN RECEPTOR POSITIVE (HCC): ICD-10-CM

## 2025-04-02 DIAGNOSIS — C50.611 MALIGNANT NEOPLASM OF AXILLARY TAIL OF RIGHT BREAST IN FEMALE, ESTROGEN RECEPTOR POSITIVE (HCC): ICD-10-CM

## 2025-04-02 DIAGNOSIS — Z41.9 ENCOUNTER FOR PROCEDURE FOR PURPOSES OTHER THAN REMEDYING HEALTH STATE, UNSPECIFIED: ICD-10-CM

## 2025-04-02 DIAGNOSIS — Z15.01 MONOALLELIC MUTATION OF CHEK2 GENE IN FEMALE PATIENT: ICD-10-CM

## 2025-04-02 DIAGNOSIS — Z15.89 MONOALLELIC MUTATION OF CHEK2 GENE IN FEMALE PATIENT: ICD-10-CM

## 2025-04-02 DIAGNOSIS — Z15.09 MONOALLELIC MUTATION OF CHEK2 GENE IN FEMALE PATIENT: ICD-10-CM

## 2025-04-02 DIAGNOSIS — Z15.02 MONOALLELIC MUTATION OF CHEK2 GENE IN FEMALE PATIENT: ICD-10-CM

## 2025-04-02 PROCEDURE — 10160 PNXR ASPIR ABSC HMTMA BULLA: CPT | Mod: 78 | Performed by: SURGERY

## 2025-04-02 PROCEDURE — ? COSMETIC FOLLOW-UP

## 2025-04-02 PROCEDURE — 99024 POSTOP FOLLOW-UP VISIT: CPT | Mod: 25 | Performed by: SURGERY

## 2025-04-02 ASSESSMENT — FIBROSIS 4 INDEX: FIB4 SCORE: 0.82

## 2025-04-02 NOTE — PROCEDURE: COSMETIC FOLLOW-UP
Price (Use Numbers Only, No Special Characters Or $): 0.00
Detail Level: Zone
Side Effects Override (Free Text): 20 units hylenex used to bilateral tear troughs

## 2025-04-02 NOTE — PROCEDURES
The indications, alternatives, risks, and expected outcomes of seroma aspiration was held with the patient. All questions answered in detail. Verbal consent obtained. The right breast was prepped with alcohol. No local anaesthesia was needed due to the incision being insensate. An 18g needle was used to aspirate 2 mL of serous fluid from the cavity, with complete collapse of the cavity, from an inferolateral approach. A Band-Aid was applied. The patient tolerated the procedure well with no apparent complications. Estimated blood loss <2 mL.

## 2025-04-02 NOTE — PROGRESS NOTES
"    Subjective    Delightful 59F s/p R PM/SLNBx 2025, here for seroma check. Since she was last seen she reports no new issues, and overall feels much better than last week.  She has been seen by Dr Sorensen of medical oncology, who is planning for endocrine therapy, and has an appt with Dr Farris of radiation oncology next week.    Objective    /78 (BP Location: Left arm, Patient Position: Sitting, BP Cuff Size: Large adult)   Pulse 87   Temp 36.2 °C (97.2 °F) (Temporal)   Ht 1.727 m (5' 8\")   Wt 59.4 kg (131 lb)   SpO2 96%   BMI 19.92 kg/m²   Gen: Alert, oriented, pleasant, no acute distress  Chest: Respirations unlabored on room air with symmetric expansion  Abd: Soft, nondistended  Ext: Warm, well-perfused  Breasts: Right breast axillary incision clean, dry, intact.  No erythema.  No exudate.  No palpable masses.  No palpable fluctuance.  No ecchymosis.  Expected contour. Bedside ultrasound performed that shows a very thin fluid collection at the surgical site.    [facility] Performance Status categories: 0= Fully active, able to carry on all pre-disease performance without restriction.    Assessment & Plan    Delightful 59F s/p R PM/SLNBx for tubular carcinoma, overall doing very well postoperatively.  Drained minuscule seroma today. We discussed her pathology results and the next steps in detail.  All questions were answered to the patient's satisfaction.   - Referrals: No new referrals needed.   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 on 2/18/2025  - Pathologic stage from 3/14/2025: Stage IA (pT1a, pN0(sn), cM0, G1, ER+, AR+, HER2-) - Signed by Christi Lopez M.D. on 4/2/2025

## 2025-04-03 ENCOUNTER — ANESTHESIA (OUTPATIENT)
Dept: SURGERY | Facility: MEDICAL CENTER | Age: 60
End: 2025-04-03
Payer: COMMERCIAL

## 2025-04-03 ENCOUNTER — ANESTHESIA EVENT (OUTPATIENT)
Dept: SURGERY | Facility: MEDICAL CENTER | Age: 60
End: 2025-04-03
Payer: COMMERCIAL

## 2025-04-03 ENCOUNTER — HOSPITAL ENCOUNTER (OUTPATIENT)
Facility: MEDICAL CENTER | Age: 60
End: 2025-04-03
Attending: SURGERY | Admitting: SURGERY
Payer: COMMERCIAL

## 2025-04-03 VITALS
DIASTOLIC BLOOD PRESSURE: 72 MMHG | OXYGEN SATURATION: 94 % | RESPIRATION RATE: 16 BRPM | HEART RATE: 78 BPM | HEIGHT: 68 IN | WEIGHT: 131.39 LBS | SYSTOLIC BLOOD PRESSURE: 139 MMHG | BODY MASS INDEX: 19.91 KG/M2 | TEMPERATURE: 96.6 F

## 2025-04-03 DIAGNOSIS — G89.18 POST-OP PAIN: ICD-10-CM

## 2025-04-03 LAB — PATHOLOGY CONSULT NOTE: NORMAL

## 2025-04-03 PROCEDURE — 88304 TISSUE EXAM BY PATHOLOGIST: CPT | Performed by: PATHOLOGY

## 2025-04-03 PROCEDURE — 700105 HCHG RX REV CODE 258: Performed by: SURGERY

## 2025-04-03 PROCEDURE — 160009 HCHG ANES TIME/MIN: Performed by: SURGERY

## 2025-04-03 PROCEDURE — 160015 HCHG STAT PREOP MINUTES: Performed by: SURGERY

## 2025-04-03 PROCEDURE — 88304 TISSUE EXAM BY PATHOLOGIST: CPT | Mod: 26 | Performed by: PATHOLOGY

## 2025-04-03 PROCEDURE — 160027 HCHG SURGERY MINUTES - 1ST 30 MINS LEVEL 2: Performed by: SURGERY

## 2025-04-03 PROCEDURE — 160002 HCHG RECOVERY MINUTES (STAT): Performed by: SURGERY

## 2025-04-03 PROCEDURE — 700111 HCHG RX REV CODE 636 W/ 250 OVERRIDE (IP): Performed by: ANESTHESIOLOGY

## 2025-04-03 PROCEDURE — 160035 HCHG PACU - 1ST 60 MINS PHASE I: Performed by: SURGERY

## 2025-04-03 PROCEDURE — 700101 HCHG RX REV CODE 250: Mod: JZ | Performed by: SURGERY

## 2025-04-03 PROCEDURE — 700102 HCHG RX REV CODE 250 W/ 637 OVERRIDE(OP): Performed by: ANESTHESIOLOGY

## 2025-04-03 PROCEDURE — 160038 HCHG SURGERY MINUTES - EA ADDL 1 MIN LEVEL 2: Performed by: SURGERY

## 2025-04-03 PROCEDURE — 160048 HCHG OR STATISTICAL LEVEL 1-5: Performed by: SURGERY

## 2025-04-03 PROCEDURE — 700101 HCHG RX REV CODE 250: Performed by: ANESTHESIOLOGY

## 2025-04-03 PROCEDURE — A9270 NON-COVERED ITEM OR SERVICE: HCPCS | Performed by: ANESTHESIOLOGY

## 2025-04-03 PROCEDURE — 160025 RECOVERY II MINUTES (STATS): Performed by: SURGERY

## 2025-04-03 PROCEDURE — 160046 HCHG PACU - 1ST 60 MINS PHASE II: Performed by: SURGERY

## 2025-04-03 RX ORDER — HALOPERIDOL 5 MG/ML
1 INJECTION INTRAMUSCULAR
Status: DISCONTINUED | OUTPATIENT
Start: 2025-04-03 | End: 2025-04-03 | Stop reason: HOSPADM

## 2025-04-03 RX ORDER — ACETAMINOPHEN 500 MG
1000 TABLET ORAL ONCE
Status: COMPLETED | OUTPATIENT
Start: 2025-04-03 | End: 2025-04-03

## 2025-04-03 RX ORDER — HYDROMORPHONE HYDROCHLORIDE 1 MG/ML
0.1 INJECTION, SOLUTION INTRAMUSCULAR; INTRAVENOUS; SUBCUTANEOUS
Status: DISCONTINUED | OUTPATIENT
Start: 2025-04-03 | End: 2025-04-03 | Stop reason: HOSPADM

## 2025-04-03 RX ORDER — SODIUM CHLORIDE, SODIUM LACTATE, POTASSIUM CHLORIDE, CALCIUM CHLORIDE 600; 310; 30; 20 MG/100ML; MG/100ML; MG/100ML; MG/100ML
INJECTION, SOLUTION INTRAVENOUS CONTINUOUS
Status: ACTIVE | OUTPATIENT
Start: 2025-04-03 | End: 2025-04-03

## 2025-04-03 RX ORDER — LIDOCAINE HYDROCHLORIDE 20 MG/ML
INJECTION, SOLUTION EPIDURAL; INFILTRATION; INTRACAUDAL; PERINEURAL PRN
Status: DISCONTINUED | OUTPATIENT
Start: 2025-04-03 | End: 2025-04-03 | Stop reason: SURG

## 2025-04-03 RX ORDER — GABAPENTIN 300 MG/1
300 CAPSULE ORAL 3 TIMES DAILY
Qty: 15 CAPSULE | Refills: 0 | Status: SHIPPED | OUTPATIENT
Start: 2025-04-03 | End: 2025-04-08

## 2025-04-03 RX ORDER — PROGESTERONE 200 MG/1
200 CAPSULE ORAL DAILY
COMMUNITY

## 2025-04-03 RX ORDER — ALBUTEROL SULFATE 5 MG/ML
2.5 SOLUTION RESPIRATORY (INHALATION)
Status: DISCONTINUED | OUTPATIENT
Start: 2025-04-03 | End: 2025-04-03 | Stop reason: HOSPADM

## 2025-04-03 RX ORDER — EPHEDRINE SULFATE 50 MG/ML
5 INJECTION, SOLUTION INTRAVENOUS
Status: DISCONTINUED | OUTPATIENT
Start: 2025-04-03 | End: 2025-04-03 | Stop reason: HOSPADM

## 2025-04-03 RX ORDER — MIDAZOLAM HYDROCHLORIDE 1 MG/ML
1 INJECTION INTRAMUSCULAR; INTRAVENOUS
Status: DISCONTINUED | OUTPATIENT
Start: 2025-04-03 | End: 2025-04-03 | Stop reason: HOSPADM

## 2025-04-03 RX ORDER — HYDRALAZINE HYDROCHLORIDE 20 MG/ML
5 INJECTION INTRAMUSCULAR; INTRAVENOUS
Status: DISCONTINUED | OUTPATIENT
Start: 2025-04-03 | End: 2025-04-03 | Stop reason: HOSPADM

## 2025-04-03 RX ORDER — ONDANSETRON 2 MG/ML
INJECTION INTRAMUSCULAR; INTRAVENOUS PRN
Status: DISCONTINUED | OUTPATIENT
Start: 2025-04-03 | End: 2025-04-03 | Stop reason: SURG

## 2025-04-03 RX ORDER — DIPHENHYDRAMINE HYDROCHLORIDE 50 MG/ML
12.5 INJECTION, SOLUTION INTRAMUSCULAR; INTRAVENOUS
Status: DISCONTINUED | OUTPATIENT
Start: 2025-04-03 | End: 2025-04-03 | Stop reason: HOSPADM

## 2025-04-03 RX ORDER — DEXAMETHASONE SODIUM PHOSPHATE 4 MG/ML
INJECTION, SOLUTION INTRA-ARTICULAR; INTRALESIONAL; INTRAMUSCULAR; INTRAVENOUS; SOFT TISSUE PRN
Status: DISCONTINUED | OUTPATIENT
Start: 2025-04-03 | End: 2025-04-03 | Stop reason: SURG

## 2025-04-03 RX ORDER — ROCURONIUM BROMIDE 10 MG/ML
INJECTION, SOLUTION INTRAVENOUS PRN
Status: DISCONTINUED | OUTPATIENT
Start: 2025-04-03 | End: 2025-04-03 | Stop reason: SURG

## 2025-04-03 RX ORDER — HYDROMORPHONE HYDROCHLORIDE 1 MG/ML
0.4 INJECTION, SOLUTION INTRAMUSCULAR; INTRAVENOUS; SUBCUTANEOUS
Status: DISCONTINUED | OUTPATIENT
Start: 2025-04-03 | End: 2025-04-03 | Stop reason: HOSPADM

## 2025-04-03 RX ORDER — SODIUM CHLORIDE, SODIUM LACTATE, POTASSIUM CHLORIDE, CALCIUM CHLORIDE 600; 310; 30; 20 MG/100ML; MG/100ML; MG/100ML; MG/100ML
INJECTION, SOLUTION INTRAVENOUS CONTINUOUS
Status: DISCONTINUED | OUTPATIENT
Start: 2025-04-03 | End: 2025-04-03 | Stop reason: HOSPADM

## 2025-04-03 RX ORDER — OXYCODONE HCL 5 MG/5 ML
10 SOLUTION, ORAL ORAL
Status: DISCONTINUED | OUTPATIENT
Start: 2025-04-03 | End: 2025-04-03 | Stop reason: HOSPADM

## 2025-04-03 RX ORDER — HYDROMORPHONE HYDROCHLORIDE 1 MG/ML
0.2 INJECTION, SOLUTION INTRAMUSCULAR; INTRAVENOUS; SUBCUTANEOUS
Status: DISCONTINUED | OUTPATIENT
Start: 2025-04-03 | End: 2025-04-03 | Stop reason: HOSPADM

## 2025-04-03 RX ORDER — MEPERIDINE HYDROCHLORIDE 25 MG/ML
12.5 INJECTION INTRAMUSCULAR; INTRAVENOUS; SUBCUTANEOUS
Status: DISCONTINUED | OUTPATIENT
Start: 2025-04-03 | End: 2025-04-03 | Stop reason: HOSPADM

## 2025-04-03 RX ORDER — OXYCODONE HCL 5 MG/5 ML
5 SOLUTION, ORAL ORAL
Status: DISCONTINUED | OUTPATIENT
Start: 2025-04-03 | End: 2025-04-03 | Stop reason: HOSPADM

## 2025-04-03 RX ORDER — OXYCODONE HYDROCHLORIDE 5 MG/1
5 TABLET ORAL EVERY 6 HOURS PRN
Qty: 12 TABLET | Refills: 0 | Status: SHIPPED | OUTPATIENT
Start: 2025-04-03 | End: 2025-04-06

## 2025-04-03 RX ORDER — CEFAZOLIN SODIUM 1 G/3ML
INJECTION, POWDER, FOR SOLUTION INTRAMUSCULAR; INTRAVENOUS PRN
Status: DISCONTINUED | OUTPATIENT
Start: 2025-04-03 | End: 2025-04-03 | Stop reason: SURG

## 2025-04-03 RX ORDER — ONDANSETRON 2 MG/ML
4 INJECTION INTRAMUSCULAR; INTRAVENOUS
Status: DISCONTINUED | OUTPATIENT
Start: 2025-04-03 | End: 2025-04-03 | Stop reason: HOSPADM

## 2025-04-03 RX ORDER — EPHEDRINE SULFATE 50 MG/ML
INJECTION, SOLUTION INTRAVENOUS PRN
Status: DISCONTINUED | OUTPATIENT
Start: 2025-04-03 | End: 2025-04-03 | Stop reason: SURG

## 2025-04-03 RX ADMIN — PROPOFOL 150 MG: 10 INJECTION, EMULSION INTRAVENOUS at 13:46

## 2025-04-03 RX ADMIN — FENTANYL CITRATE 50 MCG: 50 INJECTION, SOLUTION INTRAMUSCULAR; INTRAVENOUS at 13:41

## 2025-04-03 RX ADMIN — LIDOCAINE HYDROCHLORIDE 70 MG: 20 INJECTION, SOLUTION EPIDURAL; INFILTRATION; INTRACAUDAL; PERINEURAL at 13:46

## 2025-04-03 RX ADMIN — ROCURONIUM BROMIDE 35 MG: 10 INJECTION INTRAVENOUS at 13:46

## 2025-04-03 RX ADMIN — DEXAMETHASONE SODIUM PHOSPHATE 8 MG: 4 INJECTION INTRA-ARTICULAR; INTRALESIONAL; INTRAMUSCULAR; INTRAVENOUS; SOFT TISSUE at 13:48

## 2025-04-03 RX ADMIN — EPHEDRINE SULFATE 10 MG: 50 INJECTION, SOLUTION INTRAVENOUS at 14:00

## 2025-04-03 RX ADMIN — FENTANYL CITRATE 50 MCG: 50 INJECTION, SOLUTION INTRAMUSCULAR; INTRAVENOUS at 14:08

## 2025-04-03 RX ADMIN — CEFAZOLIN 2 G: 1 INJECTION, POWDER, FOR SOLUTION INTRAMUSCULAR; INTRAVENOUS at 13:44

## 2025-04-03 RX ADMIN — ACETAMINOPHEN 1000 MG: 500 TABLET ORAL at 11:04

## 2025-04-03 RX ADMIN — ONDANSETRON 4 MG: 2 INJECTION INTRAMUSCULAR; INTRAVENOUS at 13:56

## 2025-04-03 RX ADMIN — SODIUM CHLORIDE, POTASSIUM CHLORIDE, SODIUM LACTATE AND CALCIUM CHLORIDE: 600; 310; 30; 20 INJECTION, SOLUTION INTRAVENOUS at 11:04

## 2025-04-03 RX ADMIN — SUGAMMADEX 200 MG: 100 INJECTION, SOLUTION INTRAVENOUS at 14:14

## 2025-04-03 ASSESSMENT — PAIN SCALES - GENERAL: PAIN_LEVEL: 0

## 2025-04-03 ASSESSMENT — PAIN DESCRIPTION - PAIN TYPE
TYPE: SURGICAL PAIN
TYPE: SURGICAL PAIN
TYPE: CHRONIC PAIN
TYPE: SURGICAL PAIN

## 2025-04-03 ASSESSMENT — FIBROSIS 4 INDEX: FIB4 SCORE: 0.82

## 2025-04-03 NOTE — OP REPORT
Operative Report     Date of Procedure:  4/3/2025    PreOp Diagnosis:   Hemorrhoids     PostOp Diagnosis:   Same     Procedure(s):  Hemorrhoidectomy, internal/external, 1 pedicle  Excision of hemorrhoidal skin tag        Surgeon:  Tammy Cleary M.D.     Assistant:  None    Anesthesiologist/Type of Anesthesia:  Anesthesiologist: Anesthesiologist: Yonatan Li M.D./General     Specimen:  Anterior midline hemorrhoid     Estimated Blood Loss:  5 cc    Wound class:  Dirty     Findings:   Moderate internal/external hemorrhoid in the anterior midline.  Small right posterior hemorrhoidal skin tag.     Complications:  None    Counts:  Correct     Indications:  59-year-old female with symptomatic hemorrhoids.     Operative Procedure:  The patient was brought to the operating suite and remained in the supine position on the stretcher.  The patient was placed on cardiopulmonary monitors and general endotracheal anesthesia was induced.  She was rolled into the prone position on bolsters.  The breasts were in good position.  Both arms were both padded and swaddled at the sides with thumbs down.  A blanket and safety strap was placed over the back.  The legs were slightly flexed at the knees and the hips and padded on pillows.  Bilateral SCDs were applied.  [place] was applied.  The buttocks were distracted laterally with tape.  Antibiotics were administered.  An upper body warmer was placed. The perineum was prepped and draped in usual sterile fashion.  The preoperative safety checklist was performed.     20 cc of Exparel were injected deep into the intersphincteric plane as well as superficially around the anal verge.  Digital rectal exam and anoscopy performed circumferentially with findings as above.  There was a small hemorrhoidal skin tag in the right posterior on the anal margin skin that was excised.  The largest hemorrhoidal pedicle was identified and was grasped with an Allis clamp.  Electrocautery was used on  the external component at the level of the skin to incise it and identify the underlying sphincter.  The sphincter fibers were swept away from the overlying hemorrhoidal tissue to protect it and keep it intact.  Once the sphincter was completely away from the hemorrhoidal tissue LigaSure was used to ligate and divide the internal pedicle.  Specimen was removed and sent to pathology.  The defect was oversewn with a running 3-0 Vicryl with locking sutures above the dentate line and running below.  There was good hemostasis.  A piece of Surgicel was placed partially in the anal canal partially externally.  Fluff gauze and mesh panties were applied.  The patient tolerated the procedure well and was stable and extubated and brought to the recovery room.

## 2025-04-03 NOTE — DISCHARGE INSTRUCTIONS
HOME CARE INSTRUCTIONS    ACTIVITY: Rest and take it easy for the first 24 hours.  A responsible adult is recommended to remain with you during that time.  It is normal to feel sleepy.  We encourage you to not do anything that requires balance, judgment or coordination.    FOR 24 HOURS DO NOT:  Drive, operate machinery or run household appliances.  Drink beer or alcoholic beverages.  Make important decisions or sign legal documents.    SPECIAL INSTRUCTIONS:   POST-OPERATIVE HEMORRHOIDECTOMY INSTRUCTIONS    1.  Carefully remove bandage on the day after surgery.  There is a gauze with the anal canal that does not need to be removed and will fall out at the time of the first bowel movement. Use ice packs on and off for 24 hours.  The day following surgery, start with sitz baths 3 times a day. Good hygiene is essential for proper healing.  Wearing soft gauze pads or sanitary napkins in your underwear helps to control fluid drainage, discharge of mucous, and bleeding. Change pads and underwear frequently.    2. There are no specific dietary restrictions associated with this surgery. Avoid foods which make you constipated. Be sure to include wheat bran, fresh fruits and plenty of vegetables in your diet. Drink 7-8 glasses of water per day.    3. Avoid straining, especially a few days after surgery, when you may experience swelling that feels like an incompletely passed stool or a hemorrhoid. Milk of magnesia should be taken every day starting day of surgery until your first bowel movement.  After the first bowel movement, daily Miralax can be taken for the next 2-3 weeks. Additionally, we advise that you use an over-the-counter stool softener, such as Colace twice daily. Please call our office if no bowel movement within 72 hours.    4. Moderate exercise is healthy. You may walk as much as you like. You are permitted to go up and down stairs slowly while using a handrail. Resume normal activities after first postoperative  clinic visit. Avoid heavy lifting or strenuous activity until that time.    5. You may drive when you are comfortable enough to react and move quickly in an emergency and are no longer taking narcotics (usually 1-2 weeks after surgery). Long trips over 25 miles are not advised.    6. Return to work when you feel you are able o work with the restrictions as noted above.    7. For pain, one of the Ibuprofen compounds (Advil, Nuprin, etc.) or Tylenol is suggested. Should these not be effective in managing your discomfort, prescription pain medications should be used sparingly to avoid constipation.     8. Please call the office to report any of the following:  Temperature of 101 or higher.  Signs of infection, possibly including increasing redness, warmth, tenderness, drainage, foul odor at surgery site  Persistent moderate to severe pain  No bowel movement within 72 hours  Inability to void    9.  Please call office at [phone] to make a follow-up appointment in 4 weeks.      Tammy Cleary M.D.   [facility]  [address]     DIET: To avoid nausea, slowly advance diet as tolerated, avoiding spicy or greasy foods for the first day.  Add more substantial food to your diet according to your physician's instructions.  Babies can be fed formula or breast milk as soon as they are hungry.  INCREASE FLUIDS AND FIBER TO AVOID CONSTIPATION.    MEDICATIONS: Resume taking daily medication.  Take prescribed pain medication with food.  If no medication is prescribed, you may take non-aspirin pain medication if needed.  PAIN MEDICATION CAN BE VERY CONSTIPATING.  Take a stool softener or laxative such as senokot, pericolace, or milk of magnesia if needed.    Prescription given for Oxycodone.    Last pain medication given Tylenol 1000mg at 11:05 AM    A follow-up appointment should be arranged with your doctor in 1-2 weeks ; call to schedule.    You should CALL YOUR PHYSICIAN if you develop:  Fever  greater than 101 degrees F.  Pain not relieved by medication, or persistent nausea or vomiting.  Excessive bleeding (blood soaking through dressing) or unexpected drainage from the wound.  Extreme redness or swelling around the incision site, drainage of pus or foul smelling drainage.  Inability to urinate or empty your bladder within 8 hours.  Problems with breathing or chest pain.    You should call 911 if you develop problems with breathing or chest pain.  If you are unable to contact your doctor or surgical center, you should go to the nearest emergency room or urgent care center.  Physician's telephone #: [phone] - Dr Cleary     MILD FLU-LIKE SYMPTOMS ARE NORMAL.  YOU MAY EXPERIENCE GENERALIZED MUSCLE ACHES, THROAT IRRITATION, HEADACHE AND/OR SOME NAUSEA.    If any questions arise, call your doctor.  If your doctor is not available, please feel free to call the Surgical Center at [phone].  The Center is open Monday through Friday from 7AM to 7PM.      A registered nurse may call you a few days after your surgery to see how you are doing after your procedure.    You may also receive a survey in the mail within the next two weeks and we ask that you take a few moments to complete the survey and return it to us.  Our goal is to provide you with very good care and we value your comments.     Depression / Suicide Risk    As you are discharged from this [facility] facility, it is important to learn how to keep safe from harming yourself.    Recognize the warning signs:  Abrupt changes in personality, positive or negative- including increase in energy   Giving away possessions  Change in eating patterns- significant weight changes-  positive or negative  Change in sleeping patterns- unable to sleep or sleeping all the time   Unwillingness or inability to communicate  Depression  Unusual sadness, discouragement and loneliness  Talk of wanting to die  Neglect of personal appearance   Rebelliousness-  reckless behavior  Withdrawal from people/activities they love  Confusion- inability to concentrate     If you or a loved one observes any of these behaviors or has concerns about self-harm, here's what you can do:  Talk about it- your feelings and reasons for harming yourself  Remove any means that you might use to hurt yourself (examples: pills, rope, extension cords, firearm)  Get professional help from the community (Mental Health, Substance Abuse, psychological counseling)  Do not be alone:Call your Safe Contact- someone whom you trust who will be there for you.  Call your local CRISIS HOTLINE 652-0009 or 468-860-9183  Call your local Children's Mobile Crisis Response Team [place] (778) 377-7324 or www.[employer]  Call the toll free National Suicide Prevention Hotlines   National Suicide Prevention Lifeline 618-314-SRZI (3862)  National Hope Line Network 800-SUICIDE (884-4717)    I acknowledge receipt and understanding of these Home Care instructions.

## 2025-04-03 NOTE — ANESTHESIA POSTPROCEDURE EVALUATION
Patient: Suzy Vazquez    Procedure Summary       Date: 04/03/25 Room / Location: [place] [address] / SURGERY [facility]    Anesthesia Start: 1340 Anesthesia Stop: 1425    Procedure: EXCISIONAL HEMORRHOIDECTOMY, INTERNAL/EXTERNAL, 2 OR MORE PEDICLES (Anus) Diagnosis: (HEMORRHOIDS)    Surgeons: Tammy Cleary M.D. Responsible Provider: Yonatan Li M.D.    Anesthesia Type: general ASA Status: 2            Final Anesthesia Type: general  Last vitals  BP   Blood Pressure: (!) 146/77    Temp   36.6 °C (97.8 °F)    Pulse   (!) 110   Resp   16    SpO2   100 %      Anesthesia Post Evaluation    Patient location during evaluation: PACU  Patient participation: complete - patient participated  Level of consciousness: awake and alert  Pain score: 0    Airway patency: patent  Anesthetic complications: no  Cardiovascular status: hemodynamically stable  Respiratory status: acceptable  Hydration status: euvolemic    PONV: none          No notable events documented.     Nurse Pain Score: 0 (NPRS)

## 2025-04-03 NOTE — ANESTHESIA TIME REPORT
Anesthesia Start and Stop Event Times       Date Time Event    4/3/2025 1235 Ready for Procedure     1340 Anesthesia Start     1425 Anesthesia Stop          Responsible Staff  04/03/25      Name Role Begin End    Yonatan Li M.D. Anesth 1340 1425          Overtime Reason:  no overtime (within assigned shift)    Comments:

## 2025-04-03 NOTE — ANESTHESIA PREPROCEDURE EVALUATION
Case: [id number] Date/Time: 25 1315    Procedure: EXCISIONAL HEMORRHOIDECTOMY, INTERNAL/EXTERNAL, 2 OR MORE PEDICLES    Pre-op diagnosis: HEMORRHOIDS    Location: [facility] OR 15 / SURGERY [facility]    Surgeons: Tammy Cleary M.D.          58 yo female    P Med Hx:  Asthma  Allergy  Urinary tract infection  Lyme disease  Anxiety  Cancer     P Surg Hx:    D&C  Partial mastectomy  No reports of problems with previous anesthesia    Non-smoker  NPO  Relevant Problems   PULMONARY   (positive) Asthma      CARDIAC   (positive) Hemorrhoids      ENDO   (positive) Hypothyroidism       Physical Exam    Airway   Mallampati: II  TM distance: >3 FB  Neck ROM: full       Cardiovascular - normal exam  Rhythm: regular  Rate: normal  (-) murmur     Dental - normal exam           Pulmonary - normal exam  Breath sounds clear to auscultation     Abdominal    Neurological - normal exam                   Anesthesia Plan    ASA 2       Plan - general       Airway plan will be ETT          Induction: intravenous    Postoperative Plan: Postoperative administration of opioids is intended.    Pertinent diagnostic labs and testing reviewed    Informed Consent:    Anesthetic plan and risks discussed with patient.    Use of blood products discussed with: patient whom consented to blood products.

## 2025-04-03 NOTE — ANESTHESIA PROCEDURE NOTES
Airway    Date/Time: 4/3/2025 1:46 PM    Performed by: Yonatan Li M.D.  Authorized by: Yonatan Li M.D.    Location:  OR  Urgency:  Elective  Indications for Airway Management:  Anesthesia      Spontaneous Ventilation: absent    Sedation Level:  Deep  Preoxygenated: Yes    Patient Position:  Sniffing  Mask Difficulty Assessment:  1 - vent by mask  Final Airway Type:  Endotracheal airway  Final Endotracheal Airway:  ETT  Cuffed: Yes    Technique Used for Successful ETT Placement:  Direct laryngoscopy    Insertion Site:  Oral  Blade Type:  Karla  Laryngoscope Blade/Videolaryngoscope Blade Size:  3  ETT Size (mm):  7.0  Measured from:  Lips  ETT to Lips (cm):  21  Placement Verified by: auscultation and capnometry    Cormack-Lehane Classification:  Grade I - full view of glottis  Number of Attempts at Approach:  1

## 2025-04-03 NOTE — PROGRESS NOTES
Pharmacy Medication Reconciliation      ~Medication reconciliation updated and complete per patient   ~Allergies have been verified and updated   ~No oral ABX within the last 30 days  ~Is dispense history available in EPIC: yes  ~Patient home pharmacy :  [facility] [phone]      ~Anticoagulants (rivaroxaban, apixaban, edoxaban, dabigatran, warfarin, enoxaparin) taken in the last 14 days? NO

## 2025-04-03 NOTE — OR NURSING
Patient arrived in PACU, VSS. She is alert and oriented x4. Briefs and fluffs present, CDI.   Called and updated daughter, Renu, POC and planned  time.   Report called to ERMELINDA Arevalo. Transported to phase 2 on room air.

## 2025-04-07 ENCOUNTER — APPOINTMENT (OUTPATIENT)
Dept: URBAN - METROPOLITAN AREA CLINIC 20 | Facility: CLINIC | Age: 60
Setting detail: DERMATOLOGY
End: 2025-04-07

## 2025-04-07 DIAGNOSIS — Z41.9 ENCOUNTER FOR PROCEDURE FOR PURPOSES OTHER THAN REMEDYING HEALTH STATE, UNSPECIFIED: ICD-10-CM

## 2025-04-07 PROCEDURE — ? LASER HAIR REMOVAL

## 2025-04-08 ASSESSMENT — LIFESTYLE VARIABLES
SMOKING_STATUS: NO
TOBACCO_USE: NO

## 2025-04-10 ENCOUNTER — HOSPITAL ENCOUNTER (OUTPATIENT)
Dept: RADIATION ONCOLOGY | Facility: MEDICAL CENTER | Age: 60
End: 2025-04-10
Attending: RADIOLOGY
Payer: COMMERCIAL

## 2025-04-10 VITALS
HEART RATE: 85 BPM | BODY MASS INDEX: 20.08 KG/M2 | HEIGHT: 68 IN | SYSTOLIC BLOOD PRESSURE: 116 MMHG | OXYGEN SATURATION: 98 % | DIASTOLIC BLOOD PRESSURE: 80 MMHG | WEIGHT: 132.5 LBS

## 2025-04-10 DIAGNOSIS — Z17.0 MALIGNANT NEOPLASM OF AXILLARY TAIL OF RIGHT BREAST IN FEMALE, ESTROGEN RECEPTOR POSITIVE (HCC): ICD-10-CM

## 2025-04-10 DIAGNOSIS — C50.611 MALIGNANT NEOPLASM OF AXILLARY TAIL OF RIGHT BREAST IN FEMALE, ESTROGEN RECEPTOR POSITIVE (HCC): ICD-10-CM

## 2025-04-10 PROCEDURE — 99214 OFFICE O/P EST MOD 30 MIN: CPT | Performed by: RADIOLOGY

## 2025-04-10 PROCEDURE — 99205 OFFICE O/P NEW HI 60 MIN: CPT | Performed by: RADIOLOGY

## 2025-04-10 ASSESSMENT — PAIN SCALES - GENERAL: PAINLEVEL_OUTOF10: 3=SLIGHT PAIN

## 2025-04-10 ASSESSMENT — FIBROSIS 4 INDEX: FIB4 SCORE: 0.82

## 2025-04-10 NOTE — CT SIMULATION
PATIENT NAME Suzy Vazquez   PRIMARY PHYSICIAN Zafar Bangskyla [place] [id number]   REFERRING PHYSICIAN Litzy Sorensen D.O. 1965     Malignant neoplasm of axillary tail of right breast in female, estrogen receptor positive (HCC)  Staging form: Breast, AJCC 8th Edition  - Clinical stage from 2/18/2025: Stage IA (cT1b, cN0, cM0, G1, ER+, AK+, HER2-) - Signed by Christi Lopez M.D. on 2/18/2025  Method of lymph node assessment: Clinical  Histologic grading system: 3 grade system  - Pathologic stage from 3/14/2025: Stage IA (pT1a, pN0(sn), cM0, G1, ER+, AK+, HER2-) - Signed by Christi Lopez M.D. on 4/2/2025  Stage prefix: Initial diagnosis  Method of lymph node assessment: Diagonal lymph node biopsy  Multigene prognostic tests performed: None  Histologic grading system: 3 grade system         Treatment Planning CT Simulation        Order Questions       Question Answer    Is this for a new course of treatment? Yes    Is this an Addendum? No    Implanted Device/Pacemaker No    Simulation Status Initial    Treatment Site Breast    Laterality Right    Treatment Technique 3D CRT    Other Technique(s) 3D    Treatment Pattern/Frequency Daily    Concurrent Chemotherapy No    CT Technique 3D    Slice Thickness 3mm    Scan Extent Chest    Bowel Preparation No    Treatment Device(s) Vac Deysi     Wing Board    Patient Attire Gown    Patient Position Supine    Patient Orientation Head First    Arm Position Up    Treatment Machine No preference    Treatment Image Guidance CBCT    Frequency (CBCT) Daily    Other Orders Weekly Physics Check                  Comments       Right Breast APBI 5 fx

## 2025-04-10 NOTE — CONSULTS
RADIATION ONCOLOGY CONSULT    DATE OF SERVICE: 4/10/2025    IDENTIFICATION:  Stage IA ([id number]) G1 invasive ductal carcinoma of the right upper outer quadrant of breast ER/IA positive HER2/darianna negative with a Ki-67 of 10% status post lumpectomy and sentinel lymph node biopsy on 3/14/2025 planning on an aromatase inhibitor.      HISTORY OF PRESENT ILLNESS: I had the pleasure of seeing Ms. Vazquez today in consultation at the request of Dr. Soresnen for her breast cancer.  Patient is a 59-year-old woman who felt a palpable mass in her right axillary tail.  She has a strong family history of cancer and has been diligent about breast screening.  Her most recent mammogram did not show any area of abnormality.  Targeted ultrasound to the palpable area showed an 8 mm hypoechoic lesion.  Biopsy of this area showed an invasive ductal carcinoma with grade 1 features.  Tumor was ER/IA positive HER2/adrianna negative with a Ki-67 of 10%.  She elected to proceed with breast conservation surgery and had a lumpectomy and sentinel lymph node biopsy on 3/14/2025.  This confirmed a 4.5 mm invasive ductal carcinoma with grade 1 features and tubular histology.  All surgical margins were negative.  4 sentinel lymph nodes were negative for malignancy.  She plans to proceed with an aromatase inhibitor.  She presents to me today to further discuss the role of radiation as a part of her breast conservation therapy.    PAST MEDICAL HISTORY:   Past Medical History:   Diagnosis Date    Allergy     Anxiety     Asthma     not currently    Cancer (HCC) 02/2025    breast    Lyme disease     Malignant neoplasm of axillary tail of right breast in female, estrogen receptor positive (HCC)     Urinary tract infection        PAST SURGICAL HISTORY:  Past Surgical History:   Procedure Laterality Date    HEMORRHOIDECTOMY  04/03/2025    Procedure: EXCISIONAL HEMORRHOIDECTOMY, INTERNAL/EXTERNAL,1 PEDICLES;  Surgeon: Tammy Cleayr M.D.;  Location: [facility]GERY  [facility];  Service: General    SC PUNCTURE DRAINAGE OF LESION  2025    SC PUNCTURE DRAINAGE OF LESION  2025    PB MASTECTOMY, PARTIAL Right 2025    Procedure: RIGHT PARTIAL MASTECTOMY, RIGHT SENTINEL LYMPH NODE INJECTION AND BIOPSY;  Surgeon: Christi Loepz M.D.;  Location: SURGERY SAME DAY [facility];  Service: General    NODE BIOPSY SENTINEL Right 2025    Procedure: BIOPSY, LYMPH NODE, SENTINEL;  Surgeon: Christi Lopez M.D.;  Location: SURGERY SAME DAY [facility];  Service: General    DILATION AND CURETTAGE      OTHER      nose  surgery    PRIMARY C SECTION      US-NEEDLE CORE BX-BREAST PANEL      The cyst popped when biopsy attempted       GYNECOLOGICAL STATUS:  : 5, Para: 4, Number of Interrupted Pregnancies: 1, and Age at first birth: 29    HORMONE USE:  Post-menopause use 5 years    CURRENT MEDICATIONS:  Current Outpatient Medications   Medication Sig Dispense Refill    progesterone (PROMETRIUM) 200 MG capsule Take 200 mg by mouth every day.      estradiol (VAGIFEM) 10 MCG Tab PLACE 1 INSERT VAGINALLY TWICE  WEEKLY (Patient taking differently: Insert 10 mcg into the vagina.) 24 Tablet 3    anastrozole (ARIMIDEX) 1 MG Tab Take 1 Tablet by mouth every day. (Patient not taking: Reported on 4/10/2025) 90 Tablet 1     No current facility-administered medications for this encounter.       ALLERGIES:    Sulfa drugs    FAMILY HISTORY:    Family History   Problem Relation Age of Onset    Cancer Mother 50 - 59        Breast cancer    Breast Cancer Mother 59    Cancer Father 50 - 59        Prostate    Prostate cancer Father 50    Prostate cancer Brother 58    Prostate cancer Paternal Grandfather         Prostate       SOCIAL HISTORY:    Social History     Tobacco Use    Smoking status: Never    Smokeless tobacco: Never   Vaping Use    Vaping status: Never Used   Substance Use Topics    Alcohol use: Not Currently     Comment: Quit 2024- I stoppe whe I found the lump  "   Drug use: Never     Patient is retired from Software coding   Lives with: Self    REVIEW OF SYSTEMS:  A complete review of systems was completed in patient's chart on 4/10/2025.  All are negative with relationship to this diagnosis with the exception of:  Patient is healing well from surgery, does not have any suspicious lesions in the breast or axilla, denies any acute areas of bony tenderness or deformity, denies any new headaches or neurologic symptoms     PHYSICAL EXAM:    Vitals:    04/10/25 1342   BP: 116/80   BP Location: Left arm   Patient Position: Sitting   BP Cuff Size: Adult long   Pulse: 85   SpO2: 98%   Weight: 60.1 kg (132 lb 7.9 oz)   Height: 1.727 m (5' 8\")   Pain Score: 3=Slight Pain        ECO= Fully active, able to carry on all pre-disease performance without restriction.    PAIN:  3  What makes the pain better: Not requiring pain medication or intervention   What makes the pain worse: S/P right partial mastectomy and SLNBx   Pain controlled with current regimen: yes  Pain related to condition being seen here for: yes. 8/10 rectal pain from hemorrhoidectomy on 2025 unrelated.     GENERAL: No apparent distress.  HEENT:  Pupils are equal, round, and reactive to light.  Extraocular muscles   are intact. Sclerae nonicteric.  Conjunctivae pink.  Oral cavity, tongue   protrudes midline.   NECK:  Supple without evidence of thyromegaly.  NODES:  No peripheral adenopathy of the neck, supraclavicular fossa or axillae   bilaterally.  LUNGS:  Clear to ascultation bilaterally   HEART:  Regular rate and rhythm.  No murmur appreciated  BREAST: No suspicious lesions found in either breast or axilla.  ABDOMEN:  Soft. No evidence of hepatosplenomegaly.  Positive bowel sounds.  EXTREMITIES:  Without Edema.  NEUROLOGIC:  Cranial nerves II through XII were intact. Normal stance and gait motor and sensory grossly within normal limits       IMPRESSION:    Stage IA ([id number]) G1 invasive ductal carcinoma of " the right upper outer quadrant of breast ER/RI positive HER2/adrianna negative with a Ki-67 of 10% status post lumpectomy and sentinel lymph node biopsy on 3/14/2025 planning on an aromatase inhibitor.      RECOMMENDATIONS:    I discussed the diagnosis, prognosis, and treatment options over a 1 hr 5 min time period, 95% of that time dedicated to ongoing treatment management.  I discussed with the patient the early stage favorable features of her tumor consistent with luminal type a disease.  She understands the lack of need of systemic chemotherapy.  She plans to proceed with an aromatase inhibitor.  Next we discussed the data surrounding mastectomy versus lumpectomy and radiation.  We discussed lumpectomy with and without radiation.  Specifically we discussed the data surrounding omission of radiation in favorable subgroup populations.  Patient states with her family risk she is less inclined to pursue omission.  In addition she is very healthy and is hopeful of a prolonged life.  Therefore we discussed hypofractionated radiation without a boost or accelerated partial breast radiation.  She felt the accelerated partial breast irradiation aligned well with her thought process.  Therefore she will get 5 fractions of partial breast radiation.  She was given a simulation for April 23 at 2 PM.    We discussed the risks, benefits and side effects of treatment and the patient is amenable to treatment.  If patient has any questions or concerns, she should feel free to contact me.    Thank you for the opportunity to participate in her care.  If any questions or comments, please do not hesitate in calling.

## 2025-04-10 NOTE — PROGRESS NOTES
"Patient was seen today in clinic with Dr. Farris for consultation.  Vitals signs and weight were obtained and pain assessment was completed.  Allergies and medications were reviewed with the patient.       Vitals/Pain:  Vitals:    04/10/25 1342   BP: 116/80   BP Location: Left arm   Patient Position: Sitting   BP Cuff Size: Adult long   Pulse: 85   SpO2: 98%   Weight: 60.1 kg (132 lb 7.9 oz)   Height: 1.727 m (5' 8\")   Pain Score: 3=Slight Pain  Pain Scale: 0-10  Pain Assessement: Initial  Pain Location, Orientation and Scale: Breast: Right : Acute : 3  What makes the pain better: Not requiring pain medication or intervention   What makes the pain worse: S/P right partial mastectomy and SLNBx       Allergies:   Sulfa drugs    Current Medications:  Current Outpatient Medications   Medication Sig Dispense Refill    progesterone (PROMETRIUM) 200 MG capsule Take 200 mg by mouth every day.      estradiol (VAGIFEM) 10 MCG Tab PLACE 1 INSERT VAGINALLY TWICE  WEEKLY (Patient taking differently: Insert 10 mcg into the vagina.) 24 Tablet 3    anastrozole (ARIMIDEX) 1 MG Tab Take 1 Tablet by mouth every day. (Patient not taking: Reported on 4/10/2025) 90 Tablet 1     No current facility-administered medications for this encounter.         PCP:  Merritt Mendieta R.N.  "

## 2025-04-11 ENCOUNTER — PATIENT OUTREACH (OUTPATIENT)
Dept: ONCOLOGY | Facility: MEDICAL CENTER | Age: 60
End: 2025-04-11
Payer: COMMERCIAL

## 2025-04-11 NOTE — PROGRESS NOTES
Oncology Nurse Navigation  5 fx APBI planned with Dr. Farris.  Phoned for follow up.  No answer, left message.

## 2025-04-11 NOTE — ADDENDUM NOTE
Encounter addended by: Ana Denton on: 4/11/2025 3:17 PM   Actions taken: Charge Capture section accepted

## 2025-04-16 ENCOUNTER — HOSPITAL ENCOUNTER (OUTPATIENT)
Dept: RADIOLOGY | Facility: MEDICAL CENTER | Age: 60
End: 2025-04-16
Attending: OBSTETRICS & GYNECOLOGY
Payer: COMMERCIAL

## 2025-04-16 ENCOUNTER — HOSPITAL ENCOUNTER (OUTPATIENT)
Dept: LAB | Facility: MEDICAL CENTER | Age: 60
End: 2025-04-16
Attending: STUDENT IN AN ORGANIZED HEALTH CARE EDUCATION/TRAINING PROGRAM
Payer: COMMERCIAL

## 2025-04-16 DIAGNOSIS — C50.611 MALIGNANT NEOPLASM OF AXILLARY TAIL OF RIGHT BREAST IN FEMALE, ESTROGEN RECEPTOR POSITIVE (HCC): ICD-10-CM

## 2025-04-16 DIAGNOSIS — Z78.0 MENOPAUSE: ICD-10-CM

## 2025-04-16 DIAGNOSIS — Z17.0 MALIGNANT NEOPLASM OF AXILLARY TAIL OF RIGHT BREAST IN FEMALE, ESTROGEN RECEPTOR POSITIVE (HCC): ICD-10-CM

## 2025-04-16 DIAGNOSIS — Z11.3 SCREENING EXAMINATION FOR STI: ICD-10-CM

## 2025-04-16 LAB
HCV AB SER QL: NORMAL
HIV 1+2 AB+HIV1 P24 AG SERPL QL IA: NORMAL
T PALLIDUM AB SER QL IA: NORMAL

## 2025-04-16 PROCEDURE — 86803 HEPATITIS C AB TEST: CPT

## 2025-04-16 PROCEDURE — 87491 CHLMYD TRACH DNA AMP PROBE: CPT

## 2025-04-16 PROCEDURE — 36415 COLL VENOUS BLD VENIPUNCTURE: CPT

## 2025-04-16 PROCEDURE — 77080 DXA BONE DENSITY AXIAL: CPT

## 2025-04-16 PROCEDURE — 86780 TREPONEMA PALLIDUM: CPT

## 2025-04-16 PROCEDURE — 87389 HIV-1 AG W/HIV-1&-2 AB AG IA: CPT

## 2025-04-16 PROCEDURE — 87591 N.GONORRHOEAE DNA AMP PROB: CPT

## 2025-04-24 ENCOUNTER — HOSPITAL ENCOUNTER (OUTPATIENT)
Dept: RADIATION ONCOLOGY | Facility: MEDICAL CENTER | Age: 60
End: 2025-04-24

## 2025-04-24 PROCEDURE — 77334 RADIATION TREATMENT AID(S): CPT | Performed by: RADIOLOGY

## 2025-04-24 PROCEDURE — 77290 THER RAD SIMULAJ FIELD CPLX: CPT | Performed by: RADIOLOGY

## 2025-04-24 NOTE — RADIATION PLANNING NOTES
DATE OF SERVICE: 4/24/2025    DIAGNOSIS:  Malignant neoplasm of axillary tail of right breast in female, estrogen receptor positive (HCC)  Staging form: Breast, AJCC 8th Edition  - Clinical stage from 2/18/2025: Stage IA (cT1b, cN0, cM0, G1, ER+, KY+, HER2-) - Signed by Christi Lopez M.D. on 2/18/2025  Method of lymph node assessment: Clinical  Histologic grading system: 3 grade system  - Pathologic stage from 3/14/2025: Stage IA (pT1a, pN0(sn), cM0, G1, ER+, KY+, HER2-) - Signed by Christi Lopez M.D. on 4/2/2025  Stage prefix: Initial diagnosis  Method of lymph node assessment: [place] lymph node biopsy  Multigene prognostic tests performed: None  Histologic grading system: 3 grade system       DATE OF SERVICE: 4/24/2025    TYPE OF SIMULATION: Breast       [x] Right    [] Left      GOAL OF TREATMENT:   [x] Curative  [] Palliative  [] Oligometastatic    COMPLEX:  [x] Complex Blocking   []Arcs  [] Custom Blocks  [] >3 Sites    PROCEDURE: Patient placed in supine position on wing board with VAC FRANCISCO bag with appropriate slant to compensate for slope of chest wall.  Breast/chest wall borders marked CT scan obtained to contain entire volume of interest.      I have personally reviewed the relevant data, performed the target localization, and determined all relevant factors for this patient’s simulation.

## 2025-04-24 NOTE — RADIATION PLANNING NOTES
Clinical Treatment Planning Note    DATE OF SERVICE: 4/24/2025    DIAGNOSIS:  Malignant neoplasm of axillary tail of right breast in female, estrogen receptor positive (HCC)  Staging form: Breast, AJCC 8th Edition  - Clinical stage from 2/18/2025: Stage IA (cT1b, cN0, cM0, G1, ER+, [place]+, HER2-) - Signed by Christi Lopez M.D. on 2/18/2025  Method of lymph node assessment: Clinical  Histologic grading system: 3 grade system  - Pathologic stage from 3/14/2025: Stage IA (pT1a, pN0(sn), cM0, G1, ER+, TX+, HER2-) - Signed by Christi Lopez M.D. on 4/2/2025  Stage prefix: Initial diagnosis  Method of lymph node assessment: [place] lymph node biopsy  Multigene prognostic tests performed: None  Histologic grading system: 3 grade system         IMAGING REVIEWED:  [x] CT     [] MRI     [] PET/CT     [] BONE SCAN     [x] MAMMO     [] OTHER      TREATMENT INTENT:   [x] CURATIVE     [] MAINTENANCE     []  PALLIATIVE      []  SUPPORTIVE     []  PROPHYLACTIC     [] BENIGN     []  CONSOLIDATIVE      [] DEFINITIVE   []  OLOGIMETASTATIC      LINE OF TREATMENT:  [x] ADJUVANT   [] DEFINITIVE   [] NEOADJUVANT   [] RE-TREATMENT      TECHNIQUE PLANNED:  [] IMRT   [x] 3D   [] SBRT   [] SRS/SRT   [] HDR   [] ELECTRON       IMRT JUSTIFICATION:  []   An immediately adjacent area has been previously irradiated and abutting portals must be established with high precision.    []  Dose escalation is planned to deliver radiation doses in excess of those commonly utilized for similar tumors with conventional treatment.    []  The target volume is concave or convex, and the critical normal tissues are within or around that convexity or concavity.    []  The target volume is in close proximity to critical structures that must be protected.    []  The volume of interest must be covered with narrow margins to adequately protect  immediately adjacent structures.      FIELDS & BLOCKING:  [x] COMPLEX BLOCKS     []  = 3 TX AREAS      []  ARCS     []  CUSTOM SHEILD        []  SIMPLE BLOCK      CHEMOTHERAPY:  []  CONCURRENT     []  INDUCTION     [] SEQUENTIAL     []  <30 DAYS FROM XRT      NOTES:    OAR CONSTRAINTS: (GUIDELINES ONLY NOT ABSOLUTE)    Target Prescribed Coverage   PTV 95% of PTV covered by 95% (cGy) of RX Dose       KIKE Goal   Max point dose PTV Eval <=110%   Contralateral Breast V5% < 2Gy   Ipsilateral Lung V15% < 20Gy   Ipsilateral Lung V35% < 10Gy   Ipsilateral Lung V50% < 5Gy   Contralateral Lung V10% < 5Gy   Heart (L Sided) V5% < 20Gy   *RTOG 1005, START-A, START-B

## 2025-05-01 ENCOUNTER — HOSPITAL ENCOUNTER (OUTPATIENT)
Dept: RADIATION ONCOLOGY | Facility: MEDICAL CENTER | Age: 60
End: 2025-05-01
Attending: RADIOLOGY
Payer: COMMERCIAL

## 2025-05-01 ENCOUNTER — HOSPITAL ENCOUNTER (OUTPATIENT)
Dept: RADIATION ONCOLOGY | Facility: MEDICAL CENTER | Age: 60
End: 2025-05-01

## 2025-05-01 LAB
CHEMOTHERAPY INFUSION START DATE: NORMAL
CHEMOTHERAPY RECORDS: 3000 CGY
CHEMOTHERAPY RECORDS: 6 GY
CHEMOTHERAPY RECORDS: NORMAL
CHEMOTHERAPY RX CANCER: NORMAL
DATE 1ST CHEMO CANCER: NORMAL
RAD ONC ARIA COURSE LAST TREATMENT DATE: NORMAL
RAD ONC ARIA COURSE TREATMENT ELAPSED DAYS: NORMAL
RAD ONC ARIA REFERENCE POINT DOSAGE GIVEN TO DATE: 6 GY
RAD ONC ARIA REFERENCE POINT ID: NORMAL
RAD ONC ARIA REFERENCE POINT SESSION DOSAGE GIVEN: 6 GY

## 2025-05-01 PROCEDURE — 77280 THER RAD SIMULAJ FIELD SMPL: CPT | Performed by: RADIOLOGY

## 2025-05-01 PROCEDURE — 77412 RADIATION TX DELIVERY LVL 3: CPT | Performed by: RADIOLOGY

## 2025-05-01 PROCEDURE — 77387 GUIDANCE FOR RADJ TX DLVR: CPT | Performed by: RADIOLOGY

## 2025-05-01 PROCEDURE — 77417 THER RADIOLOGY PORT IMAGE(S): CPT | Performed by: RADIOLOGY

## 2025-05-01 PROCEDURE — 77280 THER RAD SIMULAJ FIELD SMPL: CPT | Mod: 26 | Performed by: RADIOLOGY

## 2025-05-01 NOTE — CT SIMULATION
5/1/2025  9:32 AM      Malignant neoplasm of axillary tail of right breast in female, estrogen receptor positive (HCC)  Staging form: Breast, AJCC 8th Edition  - Clinical stage from 2/18/2025: Stage IA (cT1b, cN0, cM0, G1, ER+, VA+, HER2-) - Signed by Christi Lopez M.D. on 2/18/2025  Method of lymph node assessment: Clinical  Histologic grading system: 3 grade system  - Pathologic stage from 3/14/2025: Stage IA (pT1a, pN0(sn), cM0, G1, ER+, VA+, HER2-) - Signed by Christi Lopez M.D. on 4/2/2025  Stage prefix: Initial diagnosis  Method of lymph node assessment: [place] lymph node biopsy  Multigene prognostic tests performed: None  Histologic grading system: 3 grade system       [facility] Treatment Information          5/1/2025   Aria Course Treatment Dates   Course First Treatment Date 05/01/2025    Course Last Treatment Date 05/01/2025    [facility] Treatment Summary   R Breast APBI  Plan from Course [id number]_[id number]   Fraction 1 of 5   Elapsed Course Days 0 @ 05/01/2025   Prescribed Fraction Dose 600 cGy   Prescribed Total Dose 3,000 cGy   PTV_APBI  Reference Point from Course [id number]_[id number]   Elapsed Course Days 0 @ 05/01/2025   Session Dose 600 cGy   Total Dose 600 cGy        First Visit Simple Simulation: Called by [employer] machine to verify treatment parameters including:  treatment site, treatment dose, and treatment setup prior to first treatment. Image derived shifts reviewed in all appropriate planes.  Shifts approved.  Patient treated.    I have personally reviewed the relevant data, performed the target localization, and determined all relevant factors for this patient’s simulation.

## 2025-05-02 ENCOUNTER — HOSPITAL ENCOUNTER (OUTPATIENT)
Dept: RADIATION ONCOLOGY | Facility: MEDICAL CENTER | Age: 60
End: 2025-05-02
Payer: COMMERCIAL

## 2025-05-02 LAB
CHEMOTHERAPY INFUSION START DATE: NORMAL
CHEMOTHERAPY RECORDS: 3000 CGY
CHEMOTHERAPY RECORDS: 6 GY
CHEMOTHERAPY RECORDS: NORMAL
CHEMOTHERAPY RX CANCER: NORMAL
DATE 1ST CHEMO CANCER: NORMAL
RAD ONC ARIA COURSE LAST TREATMENT DATE: NORMAL
RAD ONC ARIA COURSE TREATMENT ELAPSED DAYS: NORMAL
RAD ONC ARIA REFERENCE POINT DOSAGE GIVEN TO DATE: 12 GY
RAD ONC ARIA REFERENCE POINT ID: NORMAL
RAD ONC ARIA REFERENCE POINT SESSION DOSAGE GIVEN: 6 GY

## 2025-05-02 PROCEDURE — 77387 GUIDANCE FOR RADJ TX DLVR: CPT | Performed by: RADIOLOGY

## 2025-05-02 PROCEDURE — 77014 PR CT GUIDANCE PLACEMENT RAD THERAPY FIELDS: CPT | Mod: 26 | Performed by: RADIOLOGY

## 2025-05-02 PROCEDURE — 77412 RADIATION TX DELIVERY LVL 3: CPT | Performed by: RADIOLOGY

## 2025-05-05 ENCOUNTER — HOSPITAL ENCOUNTER (OUTPATIENT)
Dept: RADIATION ONCOLOGY | Facility: MEDICAL CENTER | Age: 60
End: 2025-05-05
Payer: COMMERCIAL

## 2025-05-05 LAB
CHEMOTHERAPY INFUSION START DATE: NORMAL
CHEMOTHERAPY RECORDS: 3000 CGY
CHEMOTHERAPY RECORDS: 6 GY
CHEMOTHERAPY RECORDS: NORMAL
CHEMOTHERAPY RX CANCER: NORMAL
DATE 1ST CHEMO CANCER: NORMAL
RAD ONC ARIA COURSE LAST TREATMENT DATE: NORMAL
RAD ONC ARIA COURSE TREATMENT ELAPSED DAYS: NORMAL
RAD ONC ARIA REFERENCE POINT DOSAGE GIVEN TO DATE: 18 GY
RAD ONC ARIA REFERENCE POINT ID: NORMAL
RAD ONC ARIA REFERENCE POINT SESSION DOSAGE GIVEN: 6 GY

## 2025-05-05 PROCEDURE — 77387 GUIDANCE FOR RADJ TX DLVR: CPT | Performed by: RADIOLOGY

## 2025-05-05 PROCEDURE — 77336 RADIATION PHYSICS CONSULT: CPT | Performed by: RADIOLOGY

## 2025-05-05 PROCEDURE — 77412 RADIATION TX DELIVERY LVL 3: CPT | Performed by: RADIOLOGY

## 2025-05-05 PROCEDURE — 77014 PR CT GUIDANCE PLACEMENT RAD THERAPY FIELDS: CPT | Mod: 26 | Performed by: RADIOLOGY

## 2025-05-06 ENCOUNTER — HOSPITAL ENCOUNTER (OUTPATIENT)
Dept: RADIATION ONCOLOGY | Facility: MEDICAL CENTER | Age: 60
End: 2025-05-06
Payer: COMMERCIAL

## 2025-05-06 LAB
CHEMOTHERAPY INFUSION START DATE: NORMAL
CHEMOTHERAPY RECORDS: 3000 CGY
CHEMOTHERAPY RECORDS: 6 GY
CHEMOTHERAPY RECORDS: NORMAL
CHEMOTHERAPY RX CANCER: NORMAL
DATE 1ST CHEMO CANCER: NORMAL
RAD ONC ARIA COURSE LAST TREATMENT DATE: NORMAL
RAD ONC ARIA COURSE TREATMENT ELAPSED DAYS: NORMAL
RAD ONC ARIA REFERENCE POINT DOSAGE GIVEN TO DATE: 24 GY
RAD ONC ARIA REFERENCE POINT ID: NORMAL
RAD ONC ARIA REFERENCE POINT SESSION DOSAGE GIVEN: 6 GY

## 2025-05-06 PROCEDURE — 77412 RADIATION TX DELIVERY LVL 3: CPT | Performed by: RADIOLOGY

## 2025-05-06 PROCEDURE — 77387 GUIDANCE FOR RADJ TX DLVR: CPT | Performed by: RADIOLOGY

## 2025-05-06 PROCEDURE — 77014 PR CT GUIDANCE PLACEMENT RAD THERAPY FIELDS: CPT | Mod: 26 | Performed by: RADIOLOGY

## 2025-05-07 ENCOUNTER — HOSPITAL ENCOUNTER (OUTPATIENT)
Dept: RADIATION ONCOLOGY | Facility: MEDICAL CENTER | Age: 60
End: 2025-05-07
Attending: RADIOLOGY
Payer: COMMERCIAL

## 2025-05-07 ENCOUNTER — HOSPITAL ENCOUNTER (OUTPATIENT)
Dept: RADIATION ONCOLOGY | Facility: MEDICAL CENTER | Age: 60
End: 2025-05-07

## 2025-05-07 ENCOUNTER — HOSPITAL ENCOUNTER (OUTPATIENT)
Dept: HEMATOLOGY ONCOLOGY | Facility: MEDICAL CENTER | Age: 60
End: 2025-05-07
Attending: STUDENT IN AN ORGANIZED HEALTH CARE EDUCATION/TRAINING PROGRAM
Payer: COMMERCIAL

## 2025-05-07 VITALS
TEMPERATURE: 98.9 F | HEART RATE: 86 BPM | WEIGHT: 130.51 LBS | HEIGHT: 68 IN | SYSTOLIC BLOOD PRESSURE: 86 MMHG | BODY MASS INDEX: 19.78 KG/M2 | DIASTOLIC BLOOD PRESSURE: 52 MMHG | OXYGEN SATURATION: 99 %

## 2025-05-07 VITALS
OXYGEN SATURATION: 98 % | WEIGHT: 130.95 LBS | SYSTOLIC BLOOD PRESSURE: 124 MMHG | HEART RATE: 76 BPM | BODY MASS INDEX: 19.91 KG/M2 | DIASTOLIC BLOOD PRESSURE: 85 MMHG

## 2025-05-07 DIAGNOSIS — C50.611 MALIGNANT NEOPLASM OF AXILLARY TAIL OF RIGHT BREAST IN FEMALE, ESTROGEN RECEPTOR POSITIVE (HCC): Primary | ICD-10-CM

## 2025-05-07 DIAGNOSIS — Z17.0 MALIGNANT NEOPLASM OF AXILLARY TAIL OF RIGHT BREAST IN FEMALE, ESTROGEN RECEPTOR POSITIVE (HCC): Primary | ICD-10-CM

## 2025-05-07 LAB
CHEMOTHERAPY INFUSION START DATE: NORMAL
CHEMOTHERAPY RECORDS: 3000 CGY
CHEMOTHERAPY RECORDS: 6 GY
CHEMOTHERAPY RECORDS: NORMAL
CHEMOTHERAPY RX CANCER: NORMAL
DATE 1ST CHEMO CANCER: NORMAL
RAD ONC ARIA COURSE LAST TREATMENT DATE: NORMAL
RAD ONC ARIA COURSE TREATMENT ELAPSED DAYS: NORMAL
RAD ONC ARIA REFERENCE POINT DOSAGE GIVEN TO DATE: 30 GY
RAD ONC ARIA REFERENCE POINT ID: NORMAL
RAD ONC ARIA REFERENCE POINT SESSION DOSAGE GIVEN: 6 GY

## 2025-05-07 PROCEDURE — 77336 RADIATION PHYSICS CONSULT: CPT | Performed by: RADIOLOGY

## 2025-05-07 PROCEDURE — 77014 PR CT GUIDANCE PLACEMENT RAD THERAPY FIELDS: CPT | Mod: 26 | Performed by: RADIOLOGY

## 2025-05-07 PROCEDURE — 99214 OFFICE O/P EST MOD 30 MIN: CPT | Performed by: STUDENT IN AN ORGANIZED HEALTH CARE EDUCATION/TRAINING PROGRAM

## 2025-05-07 PROCEDURE — 77412 RADIATION TX DELIVERY LVL 3: CPT | Performed by: RADIOLOGY

## 2025-05-07 PROCEDURE — 77427 RADIATION TX MANAGEMENT X5: CPT | Performed by: RADIOLOGY

## 2025-05-07 PROCEDURE — 77387 GUIDANCE FOR RADJ TX DLVR: CPT | Performed by: RADIOLOGY

## 2025-05-07 ASSESSMENT — PAIN SCALES - GENERAL: PAINLEVEL_OUTOF10: 2=MINIMAL-SLIGHT

## 2025-05-07 ASSESSMENT — FIBROSIS 4 INDEX
FIB4 SCORE: 0.82
FIB4 SCORE: 0.82

## 2025-05-07 NOTE — RADIATION COMPLETION NOTES
END OF TREATMENT SUMMARY    Patient name:  Suzy Vazquez    Primary Physician:  Matthew Bang D.O. MRN: [id number]  CSN: [id number]   Referring physician:  No ref. provider found  : 1965, 59 y.o.       TREATMENT SUMMARY:        Course First Treatment Date 2025    Course Last Treatment Date 2025   Course Elapsed Days 6 @ 2025   Course Intent Curative     Radiation Therapy Episodes       Active Episodes       Radiation Therapy: 3D CRT                   Radiation Treatments         Plan Last Treated On Elapsed Days Fractions Treated Prescribed Fraction Dose (cGy) Prescribed Total Dose (cGy)    R Breast APBI 2025 6 @ 2025 5 of 5 600 3,000                  Reference Point Last Treated On Elapsed Days Most Recent Session Dose (cGy) Total Dose (cGy)    PTV_APBI 2025 6 @ 2025 600 3,000                                     STAGE:   Malignant neoplasm of axillary tail of right breast in female, estrogen receptor positive (HCC)  Staging form: Breast, AJCC 8th Edition  - Clinical stage from 2025: Stage IA (cT1b, cN0, cM0, G1, ER+, [place]+, HER2-) - Signed by Christi Lopez M.D. on 2025  Method of lymph node assessment: Clinical  Histologic grading system: 3 grade system  - Pathologic stage from 3/14/2025: Stage IA (pT1a, pN0(sn), cM0, G1, ER+, IN+, HER2-) - Signed by Christi Loepz M.D. on 2025  Stage prefix: Initial diagnosis  Method of lymph node assessment: [place] lymph node biopsy  Multigene prognostic tests performed: None  Histologic grading system: 3 grade system       TREATMENT INDICATION:   Breast conservation therapy     CONCURRENT SYSTEMIC TREATMENT:   None     RT COURSE DISCONTINUED EARLY:   No     PATIENT EXPERIENCE:       2025    10:09 AM   Toxicity Assessment   Toxicity Assessment Breast   Fatigue (lethargy, malaise, asthenia) None   Fever (in the absence of neutropenia) None   Radiation Dermatitis None   Lymphatics Mild  lymphedema   RT - Pain due to RT Mild pain not interfering with function   Dyspnea Normal        FOLLOW-UP PLAN:   6 Weeks     COMMENT:          ANATOMIC TARGET SUMMARY    ANATOMIC TARGET MODALITY TECHNIQUE   Right partial breast   External beam, photons 3D Conformal            COMMENT:         DIAGRAMS:      DOSE VOLUME HISTOGRAMS:

## 2025-05-07 NOTE — ON TREATMENT VISIT
ON TREATMENT NOTE  RADIATION ONCOLOGY DEPARTMENT    Patient name:  Suzy Vazquez    Primary Physician:  Matthew Bang D.O. MRN: [id number]  [facility]: [id number]   Referring physician:  Litzy Sorensen D.O. : 1965, 59 y.o.     ENCOUNTER DATE:  25    DIAGNOSIS:    Malignant neoplasm of axillary tail of right breast in female, estrogen receptor positive (HCC)  Staging form: Breast, AJCC 8th Edition  - Clinical stage from 2025: Stage IA (cT1b, cN0, cM0, G1, ER+, KY+, HER2-) - Signed by Christi Lopez M.D. on 2025  Method of lymph node assessment: Clinical  Histologic grading system: 3 grade system  - Pathologic stage from 3/14/2025: Stage IA (pT1a, pN0(sn), cM0, G1, ER+, KY+, HER2-) - Signed by Christi Lopez M.D. on 2025  Stage prefix: Initial diagnosis  Method of lymph node assessment: [place] lymph node biopsy  Multigene prognostic tests performed: None  Histologic grading system: 3 grade system      TREATMENT SUMMARY:  formerly [facility] Treatment Information          2025   Aria Course Treatment Dates   Course First Treatment Date 2025    Course Last Treatment Date 2025    Aria Treatment Summary   R Breast APBI  Plan from Course [id number]_[id number]   Fraction 5 of 5   Elapsed Course Days 6 @ 2025   Prescribed Fraction Dose 600 cGy   Prescribed Total Dose 3,000 cGy   PTV_APBI  Reference Point from Course [id number]_[id number]   Elapsed Course Days 6 @ 2025   Session Dose 600 cGy   Total Dose 3,000 cGy      Radiation Treatments       Active   Plans   R Breast APBI   Most recent treatment: Dose planned: 600 cGy (fraction 5 of 5 on 2025)   Total: Dose planned: 3,000 cGy   Elapsed Days: 6 @ 2025           Historical   No historical radiation treatments to show.               SUBJECTIVE:   Patient is doing well.  She does not have any changes she would attribute to her radiation.    VITAL SIGNS:    Encounter Vitals  Blood Pressure: 124/85  Pulse: 76  Pulse  Oximetry: 98 %  Weight: 59.4 kg (130 lb 15.3 oz)  Weight Source: Stand Up Scale  Pain Score: 2=Minimal-Slight      5/7/2025    10:06 AM 4/10/2025     1:42 PM   Pain Assessment   Pain Score 2=MINIMAL PA 3=SLIGHT BRAD   Pain Loc BREAST BREAST          PHYSICAL EXAM:  No erythema    TOXICITY      5/7/2025    10:09 AM   Toxicity Assessment   Toxicity Assessment Breast   Fatigue (lethargy, malaise, asthenia) None   Fever (in the absence of neutropenia) None   Radiation Dermatitis None   Lymphatics Mild lymphedema   RT - Pain due to RT Mild pain not interfering with function   Dyspnea Normal         IMPRESSION:  Cancer Staging   Malignant neoplasm of axillary tail of right breast in female, estrogen receptor positive (HCC)  Staging form: Breast, AJCC 8th Edition  - Clinical stage from 2/18/2025: Stage IA (cT1b, cN0, cM0, G1, ER+, [place]+, HER2-) - Signed by Christi Lopez M.D. on 2/18/2025  - Pathologic stage from 3/14/2025: Stage IA (pT1a, pN0(sn), cM0, G1, ER+, [place]+, HER2-) - Signed by Christi Lopez M.D. on 4/2/2025      PLAN:  No change in treatment plan    Disposition:  Treatment plan reviewed. Questions answered. Continue therapy outlined.     Damon Farris M.D.    No orders of the defined types were placed in this encounter.

## 2025-05-20 NOTE — PROGRESS NOTES
Consult:  Hematology/Oncology    Primary Care:  Matthew Bang D.O.  Surgical Oncologist: Christi Lopez MD   Radiation Oncologist:  Wellness Oncologist:  Plastic Surgeon:    Diagnosis: Clinical Stage IA [id number] Pathologic stage IA pT1 cN0 M0 grade 1 IDC right breast ER +91 to 100% SC +91 to 100% HER2 0+ IHC Ki-67 10%    Treatment:  1) 3/14/2025 right partial mastectomy and sentinel lymph node biopsy  2) 5/1-7/2025 RT 3000 cGy in 5 fractions    Chief Complaint:  Follow up     History of Presenting Illness:  Suzy Vazquez is a 59 y.o. female with PMH who presents today to establish care for the above diagnosis.     Workup initiated as she felt palpable lump in the far upper outer quadrant of the right breast.  Diagnostic mammogram and right breast ultrasound was performed on 1/27/2025 and at the 10 o'clock position 12 cm from the nipple there was a heterogeneous hypoechoic ill-defined mass with an echogenic rim measuring 8 x 6 x 8 mm.     She underwent ultrasound-guided biopsy on 1/31/2025 and pathology resulted as IDC grade 1 ER + 91 to 100% SC +91 to 100% Ki-67 10% HER2 0+ IHC.     She underwent breast MRI on 2/26/2025 right breast mass measuring 5 x 4 x 8 mm.  In the left breast there was also noted to be a super circumscribed oval mass in the far posterior medial aspect of the superior breast measuring 7 x 5 x 8 mm likely representing a benign process (lymph node versus fibroadenoma), not amendable to biopsy.  No axillary lymph nodes were identified.     Left breast ultrasound was performed again showing the oval circumscribed mass in the left breast at the 11 o'clock position and left breast biopsy was recommended.  Biopsy was performed on 3/12/2025 which revealed a fibroadenoma with dilated ducts with no evidence of atypia or malignancy.      She underwent right partial mastectomy and sentinel lymph node biopsy which revealed IDC measuring 4.5 mm in the maximal dimension, margins negative, 0  out of 4 lymph nodes positive for metastatic disease.     She feels well and has been healing well postoperatively.  No acute complaints, pain is controlled, no right arm swelling or difficulties with range of motion.     She underwent menopause around [age].     She has significant family history with a brother who was previously diagnosed with prostate cancer and was on ADT, dad also with prostate cancer, mom with metastatic breast cancer, paternal grandmother with brain mass but unsure of primary or metastatic disease.  Also had a brother with alcoholic cirrhosis    Her supplements include collagen and creatine powder and a supplement for memory.      Past Medical History:   Diagnosis Date    Allergy     Anxiety     Asthma     not currently    Cancer (HCC) 02/2025    breast    Lyme disease     Malignant neoplasm of axillary tail of right breast in female, estrogen receptor positive (HCC)     Urinary tract infection      Past Surgical History:   Procedure Laterality Date    HEMORRHOIDECTOMY  04/03/2025    Procedure: EXCISIONAL HEMORRHOIDECTOMY, INTERNAL/EXTERNAL,1 PEDICLES;  Surgeon: Tammy Cleary M.D.;  Location: SURGERY [facility];  Service: General    CA PUNCTURE DRAINAGE OF LESION  03/27/2025    CA PUNCTURE DRAINAGE OF LESION  03/20/2025    PB MASTECTOMY, PARTIAL Right 03/14/2025    Procedure: RIGHT PARTIAL MASTECTOMY, RIGHT SENTINEL LYMPH NODE INJECTION AND BIOPSY;  Surgeon: Christi Lopez M.D.;  Location: SURGERY SAME DAY [facility];  Service: General    NODE BIOPSY SENTINEL Right 03/14/2025    Procedure: BIOPSY, LYMPH NODE, SENTINEL;  Surgeon: Christi Lopez M.D.;  Location: SURGERY SAME DAY [facility];  Service: General    DILATION AND CURETTAGE      OTHER      nose  surgery    PRIMARY C SECTION      US-NEEDLE CORE BX-BREAST PANEL      The cyst popped when biopsy attempted     Social History     Socioeconomic History    Marital status: Single     Spouse name: Not on file    Number  of children: Not on file    Years of education: Not on file    Highest education level: Not on file   Occupational History    Not on file   Tobacco Use    Smoking status: Never    Smokeless tobacco: Never   Vaping Use    Vaping status: Never Used   Substance and Sexual Activity    Alcohol use: Not Currently     Comment: Quit 2024- I stoppe whe I found the lump    Drug use: Never    Sexual activity: Yes   Other Topics Concern    Not on file   Social History Narrative    Single and residing in [place], living alone.    Retired from software coding.    Combined HRT 5 years.    G5,P4     Social Drivers of Health     Financial Resource Strain: Not on file   Food Insecurity: Not on file   Transportation Needs: Not on file   Physical Activity: Not on file   Stress: Not on file   Social Connections: Not on file   Intimate Partner Violence: Not on file   Housing Stability: Not on file     Family History   Problem Relation Age of Onset    Cancer Mother 50 - 59        Breast cancer    Breast Cancer Mother 59    Cancer Father 50 - 59        Prostate    Prostate cancer Father 50    Prostate cancer Brother 58    Prostate cancer Paternal Grandfather         Prostate       OB History    Para Term  AB Living   5 3 3  1 4   SAB IAB Ectopic Molar Multiple Live Births              # Outcome Date GA Lbr Joseph/2nd Weight Sex Type Anes PTL Lv   5       CS-Unspec      4 Term      Vag-Spont      3 Term      Vag-Spont      2 Term      Vag-Spont      1 AB               Obstetric Comments   Age at first delivery:29   Menarche:16   LMP:55   Combined: 5yrs       Allergies as of 2025 - Reviewed 2025   Allergen Reaction Noted    Sulfa drugs Hives 2023       Current Outpatient Medications:     estradiol (VAGIFEM) 10 MCG Tab, PLACE 1 INSERT VAGINALLY TWICE  WEEKLY (Patient taking differently: Insert 10 mcg into the vagina.), Disp: 24 Tablet, Rfl: 3    progesterone (PROMETRIUM) 200 MG capsule, Take 200 mg by mouth  "every day., Disp: , Rfl:     anastrozole (ARIMIDEX) 1 MG Tab, Take 1 Tablet by mouth every day. (Patient not taking: Reported on 4/10/2025), Disp: 90 Tablet, Rfl: 1    Review of Systems:  ROS as above    Physical Exam:  Vitals:    05/07/25 1048   BP: (!) 86/52   BP Location: Left arm   Patient Position: Sitting   BP Cuff Size: Adult   Pulse: 86   Temp: 37.2 °C (98.9 °F)   TempSrc: Temporal   SpO2: 99%   Weight: 59.2 kg (130 lb 8.2 oz)   Height: 1.727 m (5' 7.99\")       DESC; KARNOFSKY SCALE WITH ECOG EQUIVALENT: 100, Fully active, able to carry on all pre-disease performed without restriction (ECOG equivalent 0)    DISTRESS LEVEL: no apparent distress    Physical Exam  Constitutional:       General: She is not in acute distress.     Appearance: Normal appearance. She is not toxic-appearing.   HENT:      Head: Normocephalic and atraumatic.      Right Ear: External ear normal.      Left Ear: External ear normal.      Mouth/Throat:      Mouth: Mucous membranes are moist.      Pharynx: Oropharynx is clear. No oropharyngeal exudate.   Eyes:      General: No scleral icterus.     Conjunctiva/sclera: Conjunctivae normal.   Cardiovascular:      Pulses: Normal pulses.   Pulmonary:      Effort: Pulmonary effort is normal. No respiratory distress.   Abdominal:      General: There is no distension.      Palpations: Abdomen is soft. There is no mass.      Tenderness: There is no abdominal tenderness.   Musculoskeletal:      Cervical back: Neck supple. No tenderness.   Skin:     General: Skin is warm and dry.   Neurological:      Mental Status: She is alert and oriented to person, place, and time. Mental status is at baseline.   Psychiatric:         Mood and Affect: Mood normal.         Thought Content: Thought content normal.         Judgment: Judgment normal.        Labs:  Lab Results   Component Value Date/Time    WBC 6.4 03/10/2025 11:25 AM    RBC 4.57 03/10/2025 11:25 AM    HEMOGLOBIN 14.4 03/10/2025 11:25 AM    HEMATOCRIT " 43.7 03/10/2025 11:25 AM    MCV 95.6 03/10/2025 11:25 AM    MCH 31.5 03/10/2025 11:25 AM    MCHC 33.0 03/10/2025 11:25 AM    MPV 10.3 03/10/2025 11:25 AM    NEUTSPOLYS 46.70 05/20/2021 09:06 AM    LYMPHOCYTES 37.30 05/20/2021 09:06 AM    MONOCYTES 11.80 05/20/2021 09:06 AM    EOSINOPHILS 3.30 05/20/2021 09:06 AM    BASOPHILS 0.90 05/20/2021 09:06 AM      Lab Results   Component Value Date/Time    SODIUM 137 03/10/2025 11:25 AM    POTASSIUM 4.6 03/10/2025 11:25 AM    CHLORIDE 103 03/10/2025 11:25 AM    CO2 24 03/10/2025 11:25 AM    GLUCOSE 94 03/10/2025 11:25 AM    BUN 18 03/10/2025 11:25 AM    CREATININE 0.98 03/10/2025 11:25 AM      Imaging:   Screening:  No results found for this or any previous visit from the past 365 days.      Diagnostic:    MA-MAMMO CONFIRMATION LEFT 03/12/2025    Addendum 3/13/2025  4:27 PM  Pathology results of left breast mass at the 11:00, 8 cm from the nipple show breast parenchyma with focal area suggestive of a benign fibroadenoma. Results are benign and concordant. Surgical referral for newly diagnosed right breast cancer is  recommended.    Narrative  3/12/2025 2:00 PM    HISTORY/REASON FOR EXAM:  Abnormal imaging study; Left MRI findings consistent with likely intramammary lymph node versus fibroadenoma, 2nd look US with intent to biopsy      COMPARISON:  Ultrasound March 11, 2025 and MRI February 26, 2025    TECHNIQUE/EXAM DESCRIPTION AND NUMBER OF VIEWS:  Left breast ultrasound-guided 14-gauge core biopsy.    The procedure was discussed with the patient. Risks, benefits, and alternatives were discussed. Informed written consent was obtained. A timeout was performed.    The circumscribed oval mass in the left breast 11:00 position 8 cm from nipple was targeted from an inferior approach. The biopsy tract was anesthetized with 8 mL of 1% lidocaine with sodium bicarbonate after the site was draped and prepared in the usual  sterile fashion. A superficial dermatotomy was made.    5  cores were obtained using a 14-gauge Bard biopsy device. A HydroMark open coil clip marker was placed under ultrasound guidance at the biopsy site.    Two-view left mammogram was obtained following clip placement.    Hemostasis was obtained with direct pressure. There were no apparent complications.    FINDINGS: Ultrasound images obtained during the biopsy demonstrate the needle within the mass on all 5 attempts. Final ultrasound image demonstrates the clip within the targeted mass.    Postbiopsy CC and ML views demonstrate the biopsy clip in the superior posterior breast. It is not appreciated on the CC view due to the posterior medial location. No significant postbiopsy hematoma identified.    Impression  1.  Ultrasound guided 14-gauge core biopsy of benign-appearing mass in the left breast 11:00 position likely representing an intramammary lymph node versus fibroadenoma.      US-BREAST LIMITED-LEFT 03/11/2025    Narrative  3/11/2025 8:13 AM    HISTORY/REASON FOR EXAM:  Abnormal breast imaging study; Left MRI findings consistent with likely fibroadenoma versus lymph node, 2nd look US with intent to biopsy. Biopsy proven right breast cancer      TECHNIQUE/EXAM DESCRIPTION AND NUMBER OF VIEWS:  Left breast ultrasound.    COMPARISON:   MRI February 26, 2025 and mammogram January 27, 2025    FINDINGS:      Targeted ultrasound evaluation of the area of concern with both static and real-time imaging myself in the room demonstrates an oval mass in the left breast 11:00 position 8 cm from nipple. The mass measures 9 x 3 x 7 mm. It is mixed  echogenicity with question of a fatty hilum suggestive of an intramammary lymph node though no obvious feeding vessel is identified. It is benign in appearance without shadowing or vascularity. It is wider than tall.    Ultrasound evaluation of the left axilla reveals no abnormal lymph nodes.    Impression  1.  Oval circumscribed mass in the left breast 11:00 position which appears to  correspond to the MR finding likely representing intramammary lymph node versus fibroadenoma.    2.  Recommend ultrasound-guided biopsy for definitive diagnosis.    3.  The patient met with the  to discuss scheduling the biopsy.    These results were given to the patient at the time of visit.    BI-RADS Category: R4 - CATEGORY 4: SUSPICIOUS FINDING - (4A-FINDING NEEDING INTERVENTION WITH A LOW SUSPICION FOR MALIGNANCY.)       MR-BREAST-BILATERAL-WITH & W/O 02/26/2025    Narrative  2/26/2025 1:07 PM    HISTORY/REASON FOR EXAM:  New diagnosis right axillary tail IDC, eval extent of disease.      TECHNIQUE/EXAM DESCRIPTION:  MRI of the breast, bilateral without and with dynamic IV gadolinium enhancement.    MR imaging of the bilateral breasts was performed on a Lucina 3.0 Annmarie scanner using a dedicated breast coil with the patient in the prone position, with axial T1, axial fat-suppressed T2, and bolus dynamic intravenous gadolinium enhanced  fat-suppressed 3D GRE sequences at precontrast, 30 second, 2 minute, 3 minute and 5 minute delays. Post processing subtraction images were obtained. Delayed postcontrast sagittal images also obtained.    20 mL ProHance contrast was administered intravenously.    COMPARISON:  January 31, 2025, January 27, 2025    FINDINGS:  Background parenchymal enhancement is mild and symmetric.    Right breast: The breast parenchyma is heterogeneously dense. There are scattered cysts identified. There is retroareolar duct ectasia. Biopsy clip identified in the far lateral right axillary tail. There is associated mass slightly obscured by the clip.  The mass measures approximately 5 x 4 x 8 mm. No other enhancing mass or suspicious area of enhancement identified.    Left breast: The breast parenchyma is heterogeneously dense. There are scattered cysts identified. There is retroareolar duct ectasia. There is a circumscribed oval T2 bright mass in the far posterior medial  aspect of the superior breast. It measures 7 x  5 x 8 mm. It demonstrates minimal progressively increasing enhancement. This likely represents a benign process such as a lymph node or fibroadenoma. No other enhancing mass or suspicious area of enhancement identified.    Lymph nodes: No axillary lymphadenopathy identified. The axillary lymph nodes are symmetric. No internal mammary chain lymphadenopathy identified.    Upper abdomen: Unremarkable.    Bony structures: Unremarkable.    Anterior mediastinum. There is evidence of a large right thyroid gland nodule is identified on previous outside ultrasound with previous FNA in February 2023.    Impression  1.  5 x 4 x 8 mm enhancing mass adjacent biopsy clip in the far lateral right axillary tail corresponding with biopsy-proven cancer.    2.  Oval, circumscribed, T2 bright enhancing mass in the superior medial posterior left breast potentially representing intramammary lymph node or fibroadenoma. Recommend MR directed ultrasound for further evaluation and for possible biopsy. If no mass  identified with ultrasound then would recommend follow-up breast MRI in 6 months. Due to the posterior medial location it is likely not amenable MR guided biopsy.    BI-RADS Category: R6 - CATEGORY 6: KNOWN BIOPSY-PROVEN MALIGNANCY - APPROPRIATE ACTION SHOULD BE TAKEN        Pathology:  Pathology Results (Last result in 90 days)                04/03/25 1407  Narrative:                                                                                                                   Pathology Specimen  [facility]                          DEPARTMENT OF PATHOLOGY                   [address] [place] [address]  [id number]              Phone [phone]          Fax [phone]  Dianna Barrera M.D.     Tali Dugan M.D.     Romy Swanson D.O.                            DRU Voss M.D.     Shun Johnson M.D.     Estevan Garza,       "                              D.O.    Patient:    SUZY MARTINEZ        Specimen    [id number]                                           #:      Copies to:                        SURGICAL PATHOLOGY CONSULTATION      FINAL DIAGNOSIS:    A. Anterior midline hemorrhoid:         Benign squamous and glandular mucosa with focal acute          inflammation and overlying ectatic blood vessels consistent          with hemorrhoids.                                        Diagnosis performed by:                                      KASI YORK MD  ------------------------------------------------------------------------  ---------------------------------------------------------------  CODES: [id number]  [id number]    SPECIMEN(S)  A. Anterior midline hemorrhoid:    PREOPERATIVE DIAGNOSIS:  Hemorrhoids    POSTOPERATIVE DIAGNOSIS:  Hemorrhoids       GROSS DESCRIPTION:  A. Received in formalin labeled with the patient's name, medical record  number, and \"anterior midline hemorrhoid\" is a 3.6 cm in maximum  dimension irregular, cauterized, disrupted tissue fragment.  Sectioning  reveals no discrete evidence of malignancy. RS 1 /[id number] [facility]    MICROSCOPIC DESCRIPTION:  Microscopic examination was performed.  Please see diagnosis.    Report electronically signed by:  KASI YORK MD ,  Diagnosis performed at: [facility]  Pathology  Department, [address] [place] [address]      Printed 00/00/0000 [id number] SUZY MARTINEZ   Page 1 of 1 MRN#:[id number]                   Assessment & Plan:    Suzy Martinez is a 59 y.o. female with PMH who presents today to establish care for Clinical Stage IA [id number] Pathologic stage IA pT1 cN0 M0 grade 1 IDC right breast ER +91 to 100% [place] +91 to 100% HER2 0+ IHC Ki-67 10%    Treatment Plan: discussed patients pathology results including staging at length with them today and reviewed NCCN guidelines.  Shared decision making model employed during duration of this " visit.  Given 4.5 mm of disease no indication for adjuvant chemotherapy.  Given strongly HR + we will plan for adjuvant endocrine therapy with anastrozole. She has completed adjuvant RT and tolerated well and has not yet started anastrozole.  Will follow up in 6 weeks to assess tolerance.  Of note she is using estradiol cream and following with Wellness Oncology to help manage genitourinary syndrome.  Discussed side effects with her today, timing and handouts provided.  Will plan for 5 years of therapy.  DEXA scan ordered by Dr. Farris with normal bone density.      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 Thank you for allowing me to participate in the care for this patient. Please feel free to contact me for any questions or concerns.

## 2025-06-02 ENCOUNTER — APPOINTMENT (OUTPATIENT)
Dept: URBAN - METROPOLITAN AREA CLINIC 20 | Facility: CLINIC | Age: 60
Setting detail: DERMATOLOGY
End: 2025-06-02

## 2025-06-02 DIAGNOSIS — Z41.9 ENCOUNTER FOR PROCEDURE FOR PURPOSES OTHER THAN REMEDYING HEALTH STATE, UNSPECIFIED: ICD-10-CM

## 2025-06-02 PROCEDURE — ? LASER HAIR REMOVAL

## 2025-06-03 ENCOUNTER — OFFICE VISIT (OUTPATIENT)
Age: 60
End: 2025-06-03
Payer: COMMERCIAL

## 2025-06-03 VITALS
DIASTOLIC BLOOD PRESSURE: 77 MMHG | BODY MASS INDEX: 21.19 KG/M2 | HEIGHT: 67 IN | OXYGEN SATURATION: 98 % | HEART RATE: 92 BPM | WEIGHT: 135 LBS | TEMPERATURE: 98 F | SYSTOLIC BLOOD PRESSURE: 113 MMHG

## 2025-06-03 DIAGNOSIS — Z17.0 MALIGNANT NEOPLASM OF AXILLARY TAIL OF RIGHT BREAST IN FEMALE, ESTROGEN RECEPTOR POSITIVE (HCC): Primary | ICD-10-CM

## 2025-06-03 DIAGNOSIS — N95.1 VASOMOTOR SYMPTOMS DUE TO MENOPAUSE: ICD-10-CM

## 2025-06-03 DIAGNOSIS — C50.611 MALIGNANT NEOPLASM OF AXILLARY TAIL OF RIGHT BREAST IN FEMALE, ESTROGEN RECEPTOR POSITIVE (HCC): Primary | ICD-10-CM

## 2025-06-03 PROCEDURE — 99214 OFFICE O/P EST MOD 30 MIN: CPT | Performed by: OBSTETRICS & GYNECOLOGY

## 2025-06-03 RX ORDER — OXYBUTYNIN CHLORIDE 5 MG/1
5 TABLET, EXTENDED RELEASE ORAL DAILY
Qty: 30 TABLET | Refills: 3 | Status: SHIPPED | OUTPATIENT
Start: 2025-06-03

## 2025-06-03 ASSESSMENT — FIBROSIS 4 INDEX: FIB4 SCORE: 0.82

## 2025-06-03 NOTE — PROGRESS NOTES
Primary Care Provider Matthew Bang D.O.   Referring Provider: Christi Holbrook MD   Surgical Oncologist: Christi Holbrook MD   Medical Oncologist: Litzy Sorensen MD      Subjective:   59 y.o.   Patient following up today in the [facility] for ongoing lifestyle survivorship care.    Recent cancer history:   R IDC - ER/ME+HER2- 1/31/2025  R partial mastectomy [facility] 3/14/2025   Dr Sorensen - anastrazole   CHEK2 gene   DEXA - normal 4/16/2025     Health Promotion Strategies:   Management of menopause / anastrazole  Weigh bearing exercise for bone health   Vagifem     Interval history  6/3/2025:   This very delightful patient presents today wanting help with 2 particular challenges #1 help with vasomotor symptoms related to menopause the other is constipation.  The patient states that she came off of her menopause hormone therapy with this diagnosis and is having significant hot flashes both during the day and night sweats at night she otherwise is feeling well but is hesitant to start the anastrozole until she feels that these are under better control.  She is interested in medical management although does not wish to take anything which changes her mood.  She states with the diagnosis she started seeing a therapist she sees them weekly this is going very well but does not want any mood altering medications.  Patient states her energy is very good her cognition is very good she is exercising regularly she is working on building strength and doing resistance training.  New line the patient's other concern is constipation this has been a lifelong problem she is up-to-date with her colonoscopy she eats lots of greens, lots of fruits works on eating increased fiber ground flaxseed vegetables tons of plant based fiber sources.  She also tries to stay well-hydrated.  Patient also states that she has had some weight loss on her scale at home states at 1 point she got down to 125 and is  "typically at 135, although reviewing her weights in the medical offices where she has had appointments her weight is currently 135 and in the past 6 months there has not been any significant or concerning change to this.  She is doing very well with her vaginal estrogen.    Suzy Vazquez has a current medication list which includes the following prescription(s): progesterone, anastrozole, and estradiol.     Objective:  Ambulatory Vitals  Encounter Vitals  Temperature: 36.7 °C (98 °F)  Temp src: Temporal  Blood Pressure: 113/77  Pulse: 92  Pulse Oximetry: 98 %  Weight: 61.2 kg (135 lb)  Height: 170.2 cm (5' 7\")  BMI (Calculated): 21.14      Physical Exam  Vitals reviewed.   Constitutional:       Appearance: Normal appearance.   Skin:     General: Skin is warm.   Neurological:      General: No focal deficit present.      Mental Status: She is alert and oriented to person, place, and time.   Psychiatric:         Mood and Affect: Mood normal.         Behavior: Behavior normal.         Thought Content: Thought content normal.          Assessment:  1. Malignant neoplasm of axillary tail of right breast in female, estrogen receptor positive (HCC)    2. Genitourinary syndrome of menopause       Plan:  This patient is aware that the goal of the oncology wellness clinic is to assist in optimizing health, sense of well-being, reducing recurrence risk, and improving quality of life after a cancer diagnosis. For those that are high risk for a cancer diagnosis our goal is focused on preventative lifestyle strategies.   Our current plan includes:     Continue Vagifem.  We will start oxybutynin XR 5 mg daily for assistance in managing vasomotor symptoms.  We also discussed the option of Veozah given her concerned that potentially she will not tolerate oxybutynin because of her constipation history.  We discussed staying well-hydrated.  We do not feel that an SSRI is currently an option due to the above reasons.  We did " discuss her perceived weight loss.  There are number of potential factors with this.  First not overly concerned because this is not documented within our scales.  But I do not want to discount the concern that she has regarding this.  We discussed the fact that her diet is very calorically poor.  We discussed ways to bring healthy fats into her diet with the attempts of getting more calorie dense foods.  Things such as nuts, seeds, avocados, olive oil, nut butters.  She would also like body composition testing once we have the software available for the Gracie scale.  Regarding her constipation and we discussed increasing fiber she was given a list of soluble fiber sources.  We also discussed the natural foods that have a natural laxative effect such as kiwis and prunes.  Will plan for her to follow-up in about 8 to 8 weeks to see how she is doing with the oxybutynin      Total time spent today, including personal review of past history, face to face time, chart documentation, and  was 37  minutes.

## 2025-06-09 ENCOUNTER — APPOINTMENT (OUTPATIENT)
Dept: URBAN - METROPOLITAN AREA CLINIC 20 | Facility: CLINIC | Age: 60
Setting detail: DERMATOLOGY
End: 2025-06-09

## 2025-06-09 DIAGNOSIS — Z41.9 ENCOUNTER FOR PROCEDURE FOR PURPOSES OTHER THAN REMEDYING HEALTH STATE, UNSPECIFIED: ICD-10-CM

## 2025-06-09 PROCEDURE — ? DEFER

## 2025-06-23 ENCOUNTER — HOSPITAL ENCOUNTER (OUTPATIENT)
Dept: RADIATION ONCOLOGY | Facility: MEDICAL CENTER | Age: 60
End: 2025-06-23
Attending: RADIOLOGY
Payer: COMMERCIAL

## 2025-06-23 NOTE — PROGRESS NOTES
Telephone Appointment Visit   This telephone visit was initiated by the patient and they verbally consented.    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  This is starting to fade.  She has not yet started on her aromatase inhibitor.  She has started on some medications for management of her hot flashes that preexisted.    Labs / Images Reviewed:   none     Assessment and Plan:     Patient continues to follow in medical oncology I will release her from active follow-up in radiation oncology.  She does understand if she has any questions or concerns at any time she can contact me.    Follow-up: As needed    Total Time Spent (mins): 10    Damon Farris M.D.

## 2025-07-08 ENCOUNTER — TELEPHONE (OUTPATIENT)
Age: 60
End: 2025-07-08

## 2025-07-08 ENCOUNTER — OFFICE VISIT (OUTPATIENT)
Age: 60
End: 2025-07-08
Payer: COMMERCIAL

## 2025-07-08 ENCOUNTER — HOSPITAL ENCOUNTER (OUTPATIENT)
Facility: MEDICAL CENTER | Age: 60
End: 2025-07-08
Attending: STUDENT IN AN ORGANIZED HEALTH CARE EDUCATION/TRAINING PROGRAM
Payer: COMMERCIAL

## 2025-07-08 VITALS
OXYGEN SATURATION: 98 % | HEIGHT: 68 IN | BODY MASS INDEX: 20.31 KG/M2 | TEMPERATURE: 96.8 F | DIASTOLIC BLOOD PRESSURE: 75 MMHG | SYSTOLIC BLOOD PRESSURE: 117 MMHG | WEIGHT: 134 LBS

## 2025-07-08 VITALS
DIASTOLIC BLOOD PRESSURE: 64 MMHG | WEIGHT: 134.04 LBS | SYSTOLIC BLOOD PRESSURE: 100 MMHG | TEMPERATURE: 98.9 F | OXYGEN SATURATION: 99 % | HEIGHT: 68 IN | BODY MASS INDEX: 20.31 KG/M2 | HEART RATE: 72 BPM

## 2025-07-08 DIAGNOSIS — Z17.0 MALIGNANT NEOPLASM OF AXILLARY TAIL OF RIGHT BREAST IN FEMALE, ESTROGEN RECEPTOR POSITIVE (HCC): Primary | ICD-10-CM

## 2025-07-08 DIAGNOSIS — Z79.890 HORMONE REPLACEMENT THERAPY: Primary | ICD-10-CM

## 2025-07-08 DIAGNOSIS — Z15.89 MONOALLELIC MUTATION OF CHEK2 GENE IN FEMALE PATIENT: ICD-10-CM

## 2025-07-08 DIAGNOSIS — Z15.09 MONOALLELIC MUTATION OF CHEK2 GENE IN FEMALE PATIENT: ICD-10-CM

## 2025-07-08 DIAGNOSIS — Z15.01 MONOALLELIC MUTATION OF CHEK2 GENE IN FEMALE PATIENT: ICD-10-CM

## 2025-07-08 DIAGNOSIS — C50.611 MALIGNANT NEOPLASM OF AXILLARY TAIL OF RIGHT BREAST IN FEMALE, ESTROGEN RECEPTOR POSITIVE (HCC): ICD-10-CM

## 2025-07-08 DIAGNOSIS — C50.611 MALIGNANT NEOPLASM OF AXILLARY TAIL OF RIGHT BREAST IN FEMALE, ESTROGEN RECEPTOR POSITIVE (HCC): Primary | ICD-10-CM

## 2025-07-08 DIAGNOSIS — T14.8XXA HEMATOMA: ICD-10-CM

## 2025-07-08 DIAGNOSIS — Z17.0 MALIGNANT NEOPLASM OF AXILLARY TAIL OF RIGHT BREAST IN FEMALE, ESTROGEN RECEPTOR POSITIVE (HCC): ICD-10-CM

## 2025-07-08 DIAGNOSIS — Z15.02 MONOALLELIC MUTATION OF CHEK2 GENE IN FEMALE PATIENT: ICD-10-CM

## 2025-07-08 PROCEDURE — 99214 OFFICE O/P EST MOD 30 MIN: CPT | Performed by: STUDENT IN AN ORGANIZED HEALTH CARE EDUCATION/TRAINING PROGRAM

## 2025-07-08 PROCEDURE — 99213 OFFICE O/P EST LOW 20 MIN: CPT

## 2025-07-08 PROCEDURE — 99212 OFFICE O/P EST SF 10 MIN: CPT | Performed by: STUDENT IN AN ORGANIZED HEALTH CARE EDUCATION/TRAINING PROGRAM

## 2025-07-08 RX ORDER — TAMOXIFEN CITRATE 20 MG/1
20 TABLET ORAL DAILY
Qty: 30 TABLET | Refills: 11 | Status: SHIPPED | OUTPATIENT
Start: 2025-07-08 | End: 2025-07-18 | Stop reason: SDUPTHER

## 2025-07-08 ASSESSMENT — FIBROSIS 4 INDEX
FIB4 SCORE: 0.82
FIB4 SCORE: 0.82

## 2025-07-08 ASSESSMENT — ENCOUNTER SYMPTOMS
RESPIRATORY NEGATIVE: 1
CARDIOVASCULAR NEGATIVE: 1
CONSTITUTIONAL NEGATIVE: 1
EYE DISCHARGE: 0
INSOMNIA: 1
CONSTIPATION: 1
BLURRED VISION: 0
PHOTOPHOBIA: 0
DOUBLE VISION: 0
EYE PAIN: 0
EYE REDNESS: 0
MUSCULOSKELETAL NEGATIVE: 1
NEUROLOGICAL NEGATIVE: 1

## 2025-07-08 NOTE — Clinical Note
REFERRAL APPROVAL NOTICE         Sent on July 8, 2025                   Suzy Vazquez  [address]  [place] [address]                   Dear Ms. Vazquez,    After a careful review of the medical information and benefit coverage, [facility] has processed your referral. See below for additional details.    If applicable, you must be actively enrolled with your insurance for coverage of the authorized service. If you have any questions regarding your coverage, please contact your insurance directly.    REFERRAL INFORMATION   Referral #:  [id number]  Referred-To Provider    Referred-By Provider:  Plastic Surgery    TONIE Oglesby TIMOTHY A      [address]  [address]  [place] [address]  [phone] [address] [facility]  [address]  [place] [id number] [phone]    Referral Start Date:  07/08/2025  Referral End Date:   07/08/2026             SCHEDULING  If you do not already have an appointment, please call [phone] to make an appointment.     MORE INFORMATION  If you do not already have a [facility] account, sign up at: [email or web address]  You can access your medical information, make appointments, see lab results, billing information, and more.  If you have questions regarding this referral, please contact  the [facility] Referrals department at:             [phone]. Monday - Friday 8:00AM - 5:00PM.     Sincerely,    [facility]

## 2025-07-08 NOTE — PROGRESS NOTES
"         SUBJECTIVE:   Suzy Vazquez is a delightful 59 y.o. female who presents for routine follow up visit. She is status post Right PM/SLNBx on 03/14/2025 for tubular carcinoma. Currently, she reports no acute breast concerns, aside from some aesthetic concerns. She reports some hyperpigmentation after radiation treatment and she is interested in scar revision. Furthermore, she is tolerating the Anastrozole well, aside from some insomnia. There are no changes in her functional status and she is overall doing well.    Review of Systems   Constitutional: Negative.    HENT: Negative.     Eyes:  Negative for blurred vision, double vision, photophobia, pain, discharge and redness.        Dry eyes   Respiratory: Negative.     Cardiovascular: Negative.    Gastrointestinal:  Positive for constipation.   Genitourinary: Negative.    Musculoskeletal: Negative.    Skin: Negative.    Neurological: Negative.    Endo/Heme/Allergies: Negative.    Psychiatric/Behavioral:  The patient has insomnia.         OBJECTIVE:  /75 (BP Location: Left arm, Patient Position: Sitting, BP Cuff Size: Large adult)   Temp 36 °C (96.8 °F) (Temporal)   Ht 1.727 m (5' 8\")   Wt 60.8 kg (134 lb)   SpO2 98%   BMI 20.37 kg/m²    General: Alert, oriented, pleasant, no acute distress.  HEENT: Extraocular movements intact, no scleral icterus or conjunctival injection. Prominent right sided-goiter (patient aware and PCP following).  Lung: Respirations unlabored on room air. Lung sounds clear in all fields.  Cardio: Normal rate and rhythm. Heart sounds normal.  Abdomen: Soft, nondistended.  Extremities: Warm, well-perfused.  Breasts: Bilateral breasts examined in the upright and supine positions. No suspicious skin findings on either side (erythema, peau d'orange).  Well-healed right axillary incision with very mild radiation related hyperpigmentation. Bilateral breasts are heterogeneously dense without dominant masses or nodules. Bilateral " nipples everted without expressible discharge. No unexpected contour abnormalities.  No palpable cervical, supraclavicular, or axillary adenopathy.     FUNCTIONAL ASSESSMENT    Patient responses to questions:    Have you ever had shoulder surgery or been treated for a shoulder injury that resulted in a loss of range of motion?  No     Are you unable to reach into an upper cabinet and retrieve a cup or plate?  No     Do you have any limitations in daily activities because of your shoulder?  No     Overhead raise test for shoulder flexion:  Normal Range:  150-180 degrees    [facility] [place] Performance Status categories: 0= Fully active, able to carry on all pre-disease performance without restriction.    ASSESSMENT AND PLAN:  Delightful 59 y.o. female, here for follow-up. Currently she is doing well, without obvious evidence of malignancy on exam. With regards to her concerns with cosmesis, I have placed a referral to plastic surgery for scar revision per her request. We have discussed the importance of self breast examination and to monitor for changes, such as breast pain, bloody nipple discharge, skin changes, masses and countour/nipple changes, as things can change between imaging intervals. If these are to occur, I advised her to notify our office immediately. All questions answered to the best of my ability.    Referrals: Plastic Surgery  Return to office: 2026  Next Clinical Breast Exam: 2026, 2026, 2027   Next Imagin2026 B dMMG (ordered), 2026 MRI, 2027 B sMMG     Problem   Malignant Neoplasm of Axillary Tail of Right Breast in Female, Estrogen Receptor Positive (Hcc)    Right axillary tail IDC, G1, ER+MS+HER2-, Ki67 10%, pT1aN0 cM0 (jak/prog IA)  - US-guided CNBx 2025  - R partial mastectomy, SLNBx 2025 ([place]ily)    - Seroma aspirated , 2025  - Anastrozole ([place])  - PBRT ([place])  - Plastics referral for scar revision           Cancer Staging   Malignant neoplasm  of axillary tail of right breast in female, estrogen receptor positive (HCC)  Staging form: Breast, AJCC 8th Edition  - Clinical stage from 2/18/2025: Stage IA (cT1b, cN0, cM0, G1, ER+, ND+, HER2-) - Signed by Christi Lopez M.D. on 2/18/2025  - Pathologic stage from 3/14/2025: Stage IA (pT1a, pN0(sn), cM0, G1, ER+, ND+, HER2-) - Signed by Christi Lopez M.D. on 4/2/2025       External imaging and reports were independently reviewed.  I spent 25 minutes today preparing to see the patient (including chart preparation and review), obtaining and reviewing additional medical history, performing a physical exam and evaluation, documenting clinical information in the electronic health record, independently interpreting results, communicating results to the patient, and coordinating care.     Brooke Miles PA-C

## 2025-07-08 NOTE — PROGRESS NOTES
Consult:  Hematology/Oncology    Primary Care:  Matthew Bang D.O.  Surgical Oncologist: Christi Lopez MD   Radiation Oncologist: Damon Farris MD   Wellness Oncologist: Vera Farris MD    Diagnosis: Clinical Stage IA [id number] Pathologic stage IA pT1 cN0 M0 grade 1 IDC right breast ER +91 to 100% CO +91 to 100% HER2 0+ IHC Ki-67 10%    Treatment:  1) 3/14/2025 right partial mastectomy and sentinel lymph node biopsy  2) 5/1-7/2025 RT 3000 cGy in 5 fractions  3) 5/2025 anastrozole    Chief Complaint:  Follow up     History of Presenting Illness:  Suzy Vazquez is a 59 y.o. female with PMH who presents today to establish care for the above diagnosis.     Workup initiated as she felt palpable lump in the far upper outer quadrant of the right breast.  Diagnostic mammogram and right breast ultrasound was performed on 1/27/2025 and at the 10 o'clock position 12 cm from the nipple there was a heterogeneous hypoechoic ill-defined mass with an echogenic rim measuring 8 x 6 x 8 mm.     She underwent ultrasound-guided biopsy on 1/31/2025 and pathology resulted as IDC grade 1 ER + 91 to 100% CO +91 to 100% Ki-67 10% HER2 0+ IHC.     She underwent breast MRI on 2/26/2025 right breast mass measuring 5 x 4 x 8 mm.  In the left breast there was also noted to be a super circumscribed oval mass in the far posterior medial aspect of the superior breast measuring 7 x 5 x 8 mm likely representing a benign process (lymph node versus fibroadenoma), not amendable to biopsy.  No axillary lymph nodes were identified.     Left breast ultrasound was performed again showing the oval circumscribed mass in the left breast at the 11 o'clock position and left breast biopsy was recommended.  Biopsy was performed on 3/12/2025 which revealed a fibroadenoma with dilated ducts with no evidence of atypia or malignancy.      She underwent right partial mastectomy and sentinel lymph node biopsy which revealed IDC measuring 4.5 mm  in the maximal dimension, margins negative, 0 out of 4 lymph nodes positive for metastatic disease.     She feels well and has been healing well postoperatively.  No acute complaints, pain is controlled, no right arm swelling or difficulties with range of motion.     She underwent menopause around [age].     She has significant family history with a brother who was previously diagnosed with prostate cancer and was on ADT, dad also with prostate cancer, mom with metastatic breast cancer, paternal grandmother with brain mass but unsure of primary or metastatic disease.  Also had a brother with alcoholic cirrhosis    Her supplements include collagen and creatine powder and a supplement for memory.    Interval History: She has been taking oxybutinyn for hot flashes and using vagifem.  She has has started anastrozole and is tolerating well.     Past Medical History:   Diagnosis Date    Allergy     Anxiety     Asthma     not currently    Cancer (HCC) 02/2025    breast    Lyme disease     Malignant neoplasm of axillary tail of right breast in female, estrogen receptor positive (HCC)     Urinary tract infection      Past Surgical History:   Procedure Laterality Date    HEMORRHOIDECTOMY  04/03/2025    Procedure: EXCISIONAL HEMORRHOIDECTOMY, INTERNAL/EXTERNAL,1 PEDICLES;  Surgeon: Tammy Cleary M.D.;  Location: SURGERY [facility];  Service: General    NM PUNCTURE DRAINAGE OF LESION  03/27/2025    NM PUNCTURE DRAINAGE OF LESION  03/20/2025    PB MASTECTOMY, PARTIAL Right 03/14/2025    Procedure: RIGHT PARTIAL MASTECTOMY, RIGHT SENTINEL LYMPH NODE INJECTION AND BIOPSY;  Surgeon: Christi Lopez M.D.;  Location: SURGERY SAME DAY [facility];  Service: General    NODE BIOPSY SENTINEL Right 03/14/2025    Procedure: BIOPSY, LYMPH NODE, SENTINEL;  Surgeon: Christi Lopez M.D.;  Location: SURGERY SAME DAY [facility];  Service: General    DILATION AND CURETTAGE      OTHER      nose  surgery    PRIMARY C SECTION       US-NEEDLE CORE BX-BREAST PANEL      The cyst popped when biopsy attempted     Social History     Socioeconomic History    Marital status: Single     Spouse name: Not on file    Number of children: Not on file    Years of education: Not on file    Highest education level: Not on file   Occupational History    Not on file   Tobacco Use    Smoking status: Never    Smokeless tobacco: Never   Vaping Use    Vaping status: Never Used   Substance and Sexual Activity    Alcohol use: Not Currently     Comment: Quit 2024- I stoppe whe I found the lump    Drug use: Never    Sexual activity: Yes   Other Topics Concern    Not on file   Social History Narrative    Single and residing in [place], living alone.    Retired from software coding.    Combined HRT 5 years.    G5,P4     Social Drivers of Health     Financial Resource Strain: Not on file   Food Insecurity: Not on file   Transportation Needs: Not on file   Physical Activity: Not on file   Stress: Not on file   Social Connections: Not on file   Intimate Partner Violence: Not on file   Housing Stability: Not on file     Family History   Problem Relation Age of Onset    Cancer Mother 50 - 59        Breast cancer    Breast Cancer Mother 59    Cancer Father 50 - 59        Prostate    Prostate cancer Father 50    Prostate cancer Brother 58    Prostate cancer Paternal Grandfather         Prostate       OB History    Para Term  AB Living   5 3 3  1 4   SAB IAB Ectopic Molar Multiple Live Births              # Outcome Date GA Lbr Joseph/2nd Weight Sex Type Anes PTL Lv   5       CS-Unspec      4 Term      Vag-Spont      3 Term      Vag-Spont      2 Term      Vag-Spont      1 AB               Obstetric Comments   Age at first delivery:29   Menarche:16   LMP:55   Combined: 5yrs       Allergies as of 2025 - Reviewed 2025   Allergen Reaction Noted    Sulfa drugs Hives 2023       Current Outpatient Medications:     oxybutynin SR (DITROPAN-XL) 5 MG  TABLET SR 24 HR, Take 1 Tablet by mouth every day., Disp: 30 Tablet, Rfl: 3    anastrozole (ARIMIDEX) 1 MG Tab, Take 1 Tablet by mouth every day., Disp: 90 Tablet, Rfl: 1    estradiol (VAGIFEM) 10 MCG Tab, PLACE 1 INSERT VAGINALLY TWICE  WEEKLY, Disp: 24 Tablet, Rfl: 3    Review of Systems:  ROS as above    Physical Exam:  There were no vitals filed for this visit.      DESC; KARNOFSKY SCALE WITH ECOG EQUIVALENT: 100, Fully active, able to carry on all pre-disease performed without restriction (ECOG equivalent 0)    DISTRESS LEVEL: no apparent distress    Physical Exam  Constitutional:       General: She is not in acute distress.     Appearance: Normal appearance. She is not toxic-appearing.   HENT:      Head: Normocephalic and atraumatic.      Right Ear: External ear normal.      Left Ear: External ear normal.      Mouth/Throat:      Mouth: Mucous membranes are moist.      Pharynx: Oropharynx is clear. No oropharyngeal exudate.   Eyes:      General: No scleral icterus.     Conjunctiva/sclera: Conjunctivae normal.   Cardiovascular:      Pulses: Normal pulses.   Pulmonary:      Effort: Pulmonary effort is normal. No respiratory distress.   Abdominal:      General: There is no distension.      Palpations: Abdomen is soft. There is no mass.      Tenderness: There is no abdominal tenderness.   Musculoskeletal:      Cervical back: Neck supple. No tenderness.   Skin:     General: Skin is warm and dry.   Neurological:      Mental Status: She is alert and oriented to person, place, and time. Mental status is at baseline.   Psychiatric:         Mood and Affect: Mood normal.         Thought Content: Thought content normal.         Judgment: Judgment normal.      Labs:  Lab Results   Component Value Date/Time    WBC 6.4 03/10/2025 11:25 AM    RBC 4.57 03/10/2025 11:25 AM    HEMOGLOBIN 14.4 03/10/2025 11:25 AM    HEMATOCRIT 43.7 03/10/2025 11:25 AM    MCV 95.6 03/10/2025 11:25 AM    MCH 31.5 03/10/2025 11:25 AM    MCHC 33.0  03/10/2025 11:25 AM    MPV 10.3 03/10/2025 11:25 AM    NEUTSPOLYS 46.70 05/20/2021 09:06 AM    LYMPHOCYTES 37.30 05/20/2021 09:06 AM    MONOCYTES 11.80 05/20/2021 09:06 AM    EOSINOPHILS 3.30 05/20/2021 09:06 AM    BASOPHILS 0.90 05/20/2021 09:06 AM      Lab Results   Component Value Date/Time    SODIUM 137 03/10/2025 11:25 AM    POTASSIUM 4.6 03/10/2025 11:25 AM    CHLORIDE 103 03/10/2025 11:25 AM    CO2 24 03/10/2025 11:25 AM    GLUCOSE 94 03/10/2025 11:25 AM    BUN 18 03/10/2025 11:25 AM    CREATININE 0.98 03/10/2025 11:25 AM      Imaging:   Screening:  No results found for this or any previous visit from the past 365 days.      Diagnostic:    MA-MAMMO CONFIRMATION LEFT 03/12/2025    Addendum 3/13/2025  4:27 PM  Pathology results of left breast mass at the 11:00, 8 cm from the nipple show breast parenchyma with focal area suggestive of a benign fibroadenoma. Results are benign and concordant. Surgical referral for newly diagnosed right breast cancer is  recommended.    Narrative  3/12/2025 2:00 PM    HISTORY/REASON FOR EXAM:  Abnormal imaging study; Left MRI findings consistent with likely intramammary lymph node versus fibroadenoma, 2nd look US with intent to biopsy      COMPARISON:  Ultrasound March 11, 2025 and MRI February 26, 2025    TECHNIQUE/EXAM DESCRIPTION AND NUMBER OF VIEWS:  Left breast ultrasound-guided 14-gauge core biopsy.    The procedure was discussed with the patient. Risks, benefits, and alternatives were discussed. Informed written consent was obtained. A timeout was performed.    The circumscribed oval mass in the left breast 11:00 position 8 cm from nipple was targeted from an inferior approach. The biopsy tract was anesthetized with 8 mL of 1% lidocaine with sodium bicarbonate after the site was draped and prepared in the usual  sterile fashion. A superficial dermatotomy was made.    5 cores were obtained using a 14-gauge Bard biopsy device. A HydroMark open coil clip marker was placed  under ultrasound guidance at the biopsy site.    Two-view left mammogram was obtained following clip placement.    Hemostasis was obtained with direct pressure. There were no apparent complications.    FINDINGS: Ultrasound images obtained during the biopsy demonstrate the needle within the mass on all 5 attempts. Final ultrasound image demonstrates the clip within the targeted mass.    Postbiopsy CC and ML views demonstrate the biopsy clip in the superior posterior breast. It is not appreciated on the CC view due to the posterior medial location. No significant postbiopsy hematoma identified.    Impression  1.  Ultrasound guided 14-gauge core biopsy of benign-appearing mass in the left breast 11:00 position likely representing an intramammary lymph node versus fibroadenoma.      US-BREAST LIMITED-LEFT 03/11/2025    Narrative  3/11/2025 8:13 AM    HISTORY/REASON FOR EXAM:  Abnormal breast imaging study; Left MRI findings consistent with likely fibroadenoma versus lymph node, 2nd look US with intent to biopsy. Biopsy proven right breast cancer      TECHNIQUE/EXAM DESCRIPTION AND NUMBER OF VIEWS:  Left breast ultrasound.    COMPARISON:   MRI February 26, 2025 and mammogram January 27, 2025    FINDINGS:      Targeted ultrasound evaluation of the area of concern with both static and real-time imaging myself in the room demonstrates an oval mass in the left breast 11:00 position 8 cm from nipple. The mass measures 9 x 3 x 7 mm. It is mixed  echogenicity with question of a fatty hilum suggestive of an intramammary lymph node though no obvious feeding vessel is identified. It is benign in appearance without shadowing or vascularity. It is wider than tall.    Ultrasound evaluation of the left axilla reveals no abnormal lymph nodes.    Impression  1.  Oval circumscribed mass in the left breast 11:00 position which appears to correspond to the MR finding likely representing intramammary lymph node versus fibroadenoma.    2.   Recommend ultrasound-guided biopsy for definitive diagnosis.    3.  The patient met with the  to discuss scheduling the biopsy.    These results were given to the patient at the time of visit.    BI-RADS Category: R4 - CATEGORY 4: SUSPICIOUS FINDING - (4A-FINDING NEEDING INTERVENTION WITH A LOW SUSPICION FOR MALIGNANCY.)       MR-BREAST-BILATERAL-WITH & W/O 02/26/2025    Narrative  2/26/2025 1:07 PM    HISTORY/REASON FOR EXAM:  New diagnosis right axillary tail IDC, eval extent of disease.      TECHNIQUE/EXAM DESCRIPTION:  MRI of the breast, bilateral without and with dynamic IV gadolinium enhancement.    MR imaging of the bilateral breasts was performed on a Lucina 3.0 Annmarie scanner using a dedicated breast coil with the patient in the prone position, with axial T1, axial fat-suppressed T2, and bolus dynamic intravenous gadolinium enhanced  fat-suppressed 3D GRE sequences at precontrast, 30 second, 2 minute, 3 minute and 5 minute delays. Post processing subtraction images were obtained. Delayed postcontrast sagittal images also obtained.    20 mL ProHance contrast was administered intravenously.    COMPARISON:  January 31, 2025, January 27, 2025    FINDINGS:  Background parenchymal enhancement is mild and symmetric.    Right breast: The breast parenchyma is heterogeneously dense. There are scattered cysts identified. There is retroareolar duct ectasia. Biopsy clip identified in the far lateral right axillary tail. There is associated mass slightly obscured by the clip.  The mass measures approximately 5 x 4 x 8 mm. No other enhancing mass or suspicious area of enhancement identified.    Left breast: The breast parenchyma is heterogeneously dense. There are scattered cysts identified. There is retroareolar duct ectasia. There is a circumscribed oval T2 bright mass in the far posterior medial aspect of the superior breast. It measures 7 x  5 x 8 mm. It demonstrates minimal progressively  increasing enhancement. This likely represents a benign process such as a lymph node or fibroadenoma. No other enhancing mass or suspicious area of enhancement identified.    Lymph nodes: No axillary lymphadenopathy identified. The axillary lymph nodes are symmetric. No internal mammary chain lymphadenopathy identified.    Upper abdomen: Unremarkable.    Bony structures: Unremarkable.    Anterior mediastinum. There is evidence of a large right thyroid gland nodule is identified on previous outside ultrasound with previous FNA in February 2023.    Impression  1.  5 x 4 x 8 mm enhancing mass adjacent biopsy clip in the far lateral right axillary tail corresponding with biopsy-proven cancer.    2.  Oval, circumscribed, T2 bright enhancing mass in the superior medial posterior left breast potentially representing intramammary lymph node or fibroadenoma. Recommend MR directed ultrasound for further evaluation and for possible biopsy. If no mass  identified with ultrasound then would recommend follow-up breast MRI in 6 months. Due to the posterior medial location it is likely not amenable MR guided biopsy.    BI-RADS Category: R6 - CATEGORY 6: KNOWN BIOPSY-PROVEN MALIGNANCY - APPROPRIATE ACTION SHOULD BE TAKEN        Pathology:  Pathology Results (Last result in 90 days)                04/03/25 1407  Narrative:                                                                                                                   Pathology Specimen  [facility]                          DEPARTMENT OF PATHOLOGY                   [address] [place] [address]  [id number]              Phone [phone]          Fax [phone]  Dianna Barrera M.D.     Tali Dugan M.D.     Romy Swanson D.O.                            DRU Voss M.D.     Shun Johnson M.D.     Estevan Garza,                                    D.O.    Patient:    GISELLA MARTINEZ     "[id number]                                           #:      Copies to:                        SURGICAL PATHOLOGY CONSULTATION      FINAL DIAGNOSIS:    A. Anterior midline hemorrhoid:         Benign squamous and glandular mucosa with focal acute          inflammation and overlying ectatic blood vessels consistent          with hemorrhoids.                                        Diagnosis performed by:                                      KASI YORK MD  ------------------------------------------------------------------------  ---------------------------------------------------------------  CODES: [id number]  [id number]    SPECIMEN(S)  A. Anterior midline hemorrhoid:    PREOPERATIVE DIAGNOSIS:  Hemorrhoids    POSTOPERATIVE DIAGNOSIS:  Hemorrhoids       GROSS DESCRIPTION:  A. Received in formalin labeled with the patient's name, medical record  number, and \"anterior midline hemorrhoid\" is a 3.6 cm in maximum  dimension irregular, cauterized, disrupted tissue fragment.  Sectioning  reveals no discrete evidence of malignancy. RS 1 /[id number] [facility]    MICROSCOPIC DESCRIPTION:  Microscopic examination was performed.  Please see diagnosis.    Report electronically signed by:  KASI YORK MD ,  Diagnosis performed at: [facility]  Pathology  Department, [address] [place] [address]  [id number]      Printed 00/00/0000 [id number] SUZY MARTINEZ   Page 1 of 1 MRN#:[id number]                   Assessment & Plan:    Suzy Martinez is a 59 y.o. female with PMH who presents today to establish care for Clinical Stage IA [id number] Pathologic stage IA pT1 cN0 M0 grade 1 IDC right breast ER +91 to 100% AL +91 to 100% HER2 0+ IHC Ki-67 10%    Treatment Plan: discussed patients pathology results including staging at length with them today and reviewed NCCN guidelines.  Shared decision making model employed during duration of this visit.  Given 4.5 mm of disease no indication for adjuvant chemotherapy.  Given strongly HR + we " will plan for adjuvant endocrine therapy initially started with anastrozole after she completed adjuvant RT. Of note she is using estradiol cream and following with Wellness Oncology to help manage genitourinary syndrome.  In light of this discussed changing endocrine therapy to tamoxifen and she agreed.  Discussed side effects with her today, timing and handouts provided.  Will plan for 5 years of therapy.  DEXA scan ordered by Dr. Farris with normal bone density.  Of note having insomnia that she thinks is medication related.  Instructed her to continue melatonin and try magnesium glycinate at bedtime.  Is being referred to Dr. Frye for reconstruction.     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 Thank you for allowing me to participate in the care for this patient. Please feel free to contact me for any questions or concerns.

## 2025-07-10 ENCOUNTER — APPOINTMENT (OUTPATIENT)
Dept: URBAN - METROPOLITAN AREA CLINIC 20 | Facility: CLINIC | Age: 60
Setting detail: DERMATOLOGY
End: 2025-07-10

## 2025-07-10 DIAGNOSIS — Z41.9 ENCOUNTER FOR PROCEDURE FOR PURPOSES OTHER THAN REMEDYING HEALTH STATE, UNSPECIFIED: ICD-10-CM

## 2025-07-10 PROCEDURE — ? SKINPEN

## 2025-07-10 ASSESSMENT — LOCATION ZONE DERM
LOCATION ZONE: FACE
LOCATION ZONE: NOSE
LOCATION ZONE: EAR
LOCATION ZONE: LIP
LOCATION ZONE: NECK

## 2025-07-10 ASSESSMENT — LOCATION DETAILED DESCRIPTION DERM
LOCATION DETAILED: RIGHT MEDIAL MALAR CHEEK
LOCATION DETAILED: LEFT LATERAL EYEBROW
LOCATION DETAILED: RIGHT CENTRAL EYEBROW
LOCATION DETAILED: GLABELLA
LOCATION DETAILED: LEFT INFERIOR LATERAL NECK
LOCATION DETAILED: RIGHT INFERIOR TEMPLE
LOCATION DETAILED: LEFT SUPERIOR LATERAL MALAR CHEEK
LOCATION DETAILED: RIGHT LATERAL SUBMANDIBULAR CHEEK
LOCATION DETAILED: RIGHT CENTRAL BUCCAL CHEEK
LOCATION DETAILED: LEFT SUPERIOR CENTRAL MALAR CHEEK
LOCATION DETAILED: LEFT CHIN
LOCATION DETAILED: LEFT SUPERIOR LATERAL BUCCAL CHEEK
LOCATION DETAILED: LEFT CENTRAL LATERAL NECK
LOCATION DETAILED: LEFT INFERIOR MEDIAL MALAR CHEEK
LOCATION DETAILED: LEFT SUPERIOR TEMPLE
LOCATION DETAILED: RIGHT SUBMANDIBULAR AREA
LOCATION DETAILED: RIGHT INFERIOR LATERAL NECK
LOCATION DETAILED: NASAL DORSUM
LOCATION DETAILED: PHILTRUM
LOCATION DETAILED: RIGHT TRAGUS
LOCATION DETAILED: SUBMENTAL CHIN
LOCATION DETAILED: RIGHT INFERIOR LATERAL FOREHEAD
LOCATION DETAILED: RIGHT SUPERIOR LATERAL NECK
LOCATION DETAILED: INFERIOR MID FOREHEAD
LOCATION DETAILED: LEFT INFERIOR TEMPLE

## 2025-07-10 ASSESSMENT — LOCATION SIMPLE DESCRIPTION DERM
LOCATION SIMPLE: LEFT CHEEK
LOCATION SIMPLE: CHIN
LOCATION SIMPLE: GLABELLA
LOCATION SIMPLE: INFERIOR FOREHEAD
LOCATION SIMPLE: SUBMENTAL CHIN
LOCATION SIMPLE: RIGHT SUBMANDIBULAR AREA
LOCATION SIMPLE: RIGHT FOREHEAD
LOCATION SIMPLE: LEFT EYEBROW
LOCATION SIMPLE: LEFT TEMPLE
LOCATION SIMPLE: RIGHT EYEBROW
LOCATION SIMPLE: RIGHT EAR
LOCATION SIMPLE: UPPER LIP
LOCATION SIMPLE: NECK
LOCATION SIMPLE: RIGHT CHEEK
LOCATION SIMPLE: NOSE
LOCATION SIMPLE: RIGHT TEMPLE

## 2025-07-10 NOTE — PROCEDURE: SKINPEN
Price (Use Numbers Only, No Special Characters Or $): 989
Topical Anesthesia?: 23% lidocaine, 7% tetracaine
Location #1: Nose, stephen-orbital, forehead
Detail Level: Zone
Serum (Optional): Hyaluronic Hydrating Gel
Treatment Number (Optional): 4
Location #5: n/a
Speed (Optional): 3 passes
Post-Care Instructions: After the procedure, take precautions agains sun exposure. Do not apply sunscreen for 12 hours after the procedure. Do not apply make-up for 12 hours after the procedure. Avoid alcohol based toners for 10-14 days. After 2-3 days patients can return to their regular skin regimen.\\n\\nReviewed expectations (needs 6 @4-8 weeks apart for results)
Location #3: neck
Consent: Written consent obtained, risks reviewed including but not limited to pain, scarring, infection and incomplete improvement.  Patient understands the procedure is cosmetic in nature and will require out of pocket payment.
Depth In Mm: 1.5
Depth In Mm: 0.5
Location #2: cheeks, upper lip, chin
Depth In Mm: 1.25

## 2025-07-14 ENCOUNTER — APPOINTMENT (OUTPATIENT)
Dept: URBAN - METROPOLITAN AREA CLINIC 20 | Facility: CLINIC | Age: 60
Setting detail: DERMATOLOGY
End: 2025-07-14

## 2025-07-14 DIAGNOSIS — Z41.9 ENCOUNTER FOR PROCEDURE FOR PURPOSES OTHER THAN REMEDYING HEALTH STATE, UNSPECIFIED: ICD-10-CM

## 2025-07-14 PROCEDURE — ? LASER HAIR REMOVAL

## 2025-07-18 DIAGNOSIS — C50.611 MALIGNANT NEOPLASM OF AXILLARY TAIL OF RIGHT BREAST IN FEMALE, ESTROGEN RECEPTOR POSITIVE (HCC): ICD-10-CM

## 2025-07-18 DIAGNOSIS — Z17.0 MALIGNANT NEOPLASM OF AXILLARY TAIL OF RIGHT BREAST IN FEMALE, ESTROGEN RECEPTOR POSITIVE (HCC): ICD-10-CM

## 2025-07-18 DIAGNOSIS — Z15.89 MONOALLELIC MUTATION OF CHEK2 GENE IN FEMALE PATIENT: ICD-10-CM

## 2025-07-18 DIAGNOSIS — Z15.02 MONOALLELIC MUTATION OF CHEK2 GENE IN FEMALE PATIENT: ICD-10-CM

## 2025-07-18 DIAGNOSIS — Z15.09 MONOALLELIC MUTATION OF CHEK2 GENE IN FEMALE PATIENT: ICD-10-CM

## 2025-07-18 DIAGNOSIS — Z15.01 MONOALLELIC MUTATION OF CHEK2 GENE IN FEMALE PATIENT: ICD-10-CM

## 2025-07-18 DIAGNOSIS — Z79.890 HORMONE REPLACEMENT THERAPY: ICD-10-CM

## 2025-07-18 RX ORDER — TAMOXIFEN CITRATE 20 MG/1
20 TABLET ORAL DAILY
Qty: 90 TABLET | Refills: 6 | Status: SHIPPED | OUTPATIENT
Start: 2025-07-18

## 2025-07-29 ENCOUNTER — APPOINTMENT (OUTPATIENT)
Age: 60
End: 2025-07-29
Payer: COMMERCIAL

## 2025-08-04 ENCOUNTER — HOSPITAL ENCOUNTER (OUTPATIENT)
Dept: LAB | Facility: MEDICAL CENTER | Age: 60
End: 2025-08-04
Attending: STUDENT IN AN ORGANIZED HEALTH CARE EDUCATION/TRAINING PROGRAM
Payer: COMMERCIAL

## 2025-08-04 ENCOUNTER — APPOINTMENT (OUTPATIENT)
Age: 60
End: 2025-08-04
Payer: COMMERCIAL

## 2025-08-04 DIAGNOSIS — C50.611 MALIGNANT NEOPLASM OF AXILLARY TAIL OF RIGHT BREAST IN FEMALE, ESTROGEN RECEPTOR POSITIVE (HCC): ICD-10-CM

## 2025-08-04 DIAGNOSIS — Z79.890 HORMONE REPLACEMENT THERAPY: ICD-10-CM

## 2025-08-04 DIAGNOSIS — Z17.0 MALIGNANT NEOPLASM OF AXILLARY TAIL OF RIGHT BREAST IN FEMALE, ESTROGEN RECEPTOR POSITIVE (HCC): ICD-10-CM

## 2025-08-04 DIAGNOSIS — T14.8XXA HEMATOMA: ICD-10-CM

## 2025-08-04 PROCEDURE — 85730 THROMBOPLASTIN TIME PARTIAL: CPT

## 2025-08-04 PROCEDURE — 85610 PROTHROMBIN TIME: CPT

## 2025-08-04 PROCEDURE — 36415 COLL VENOUS BLD VENIPUNCTURE: CPT

## 2025-08-05 LAB
APTT PPP: 25.2 SEC (ref 24.7–36)
INR PPP: 1.01 (ref 0.87–1.13)
PROTHROMBIN TIME: 13.3 SEC (ref 12–14.6)

## 2025-08-13 ENCOUNTER — APPOINTMENT (OUTPATIENT)
Age: 60
End: 2025-08-13
Payer: COMMERCIAL

## 2025-08-18 ENCOUNTER — APPOINTMENT (OUTPATIENT)
Dept: URBAN - METROPOLITAN AREA CLINIC 20 | Facility: CLINIC | Age: 60
Setting detail: DERMATOLOGY
End: 2025-08-18

## 2025-08-18 DIAGNOSIS — Z41.9 ENCOUNTER FOR PROCEDURE FOR PURPOSES OTHER THAN REMEDYING HEALTH STATE, UNSPECIFIED: ICD-10-CM

## 2025-08-18 PROCEDURE — ? MICRONEEDLING

## 2025-08-18 ASSESSMENT — LOCATION SIMPLE DESCRIPTION DERM
LOCATION SIMPLE: RIGHT TEMPLE
LOCATION SIMPLE: CHIN
LOCATION SIMPLE: LEFT TEMPLE
LOCATION SIMPLE: GLABELLA
LOCATION SIMPLE: NOSE
LOCATION SIMPLE: LEFT CHEEK
LOCATION SIMPLE: LEFT EYEBROW
LOCATION SIMPLE: NECK
LOCATION SIMPLE: RIGHT EYEBROW
LOCATION SIMPLE: SUBMENTAL CHIN
LOCATION SIMPLE: RIGHT CHEEK

## 2025-08-18 ASSESSMENT — LOCATION ZONE DERM
LOCATION ZONE: NOSE
LOCATION ZONE: NECK
LOCATION ZONE: FACE

## 2025-08-18 ASSESSMENT — LOCATION DETAILED DESCRIPTION DERM
LOCATION DETAILED: RIGHT SUPERIOR TEMPLE
LOCATION DETAILED: GLABELLA
LOCATION DETAILED: RIGHT CENTRAL LATERAL NECK
LOCATION DETAILED: LEFT INFERIOR LATERAL NECK
LOCATION DETAILED: NASAL DORSUM
LOCATION DETAILED: RIGHT MEDIAL MALAR CHEEK
LOCATION DETAILED: LEFT LATERAL EYEBROW
LOCATION DETAILED: SUBMENTAL CHIN
LOCATION DETAILED: RIGHT SUPERIOR PREAURICULAR CHEEK
LOCATION DETAILED: LEFT MEDIAL MALAR CHEEK
LOCATION DETAILED: RIGHT CENTRAL EYEBROW
LOCATION DETAILED: LEFT CENTRAL LATERAL NECK
LOCATION DETAILED: RIGHT SUPERIOR LATERAL BUCCAL CHEEK
LOCATION DETAILED: LEFT SUPERIOR TEMPLE
LOCATION DETAILED: RIGHT INFERIOR LATERAL NECK
LOCATION DETAILED: RIGHT CENTRAL MALAR CHEEK
LOCATION DETAILED: LEFT CENTRAL MALAR CHEEK
LOCATION DETAILED: LEFT LATERAL MALAR CHEEK
LOCATION DETAILED: RIGHT CHIN
LOCATION DETAILED: LEFT LATERAL BUCCAL CHEEK

## (undated) DEVICE — SODIUM CHL IRRIGATION 0.9% 1000ML (12EA/CA)

## (undated) DEVICE — HEMOSTAT SURG ABSORBABLE - 4 X 8 IN SURGICEL (24EA/CA)

## (undated) DEVICE — SUTURE 4-0 MONOCRYL PLUS PS-2 - 27 INCH (36/BX)

## (undated) DEVICE — SUCTION INSTRUMENT YANKAUER BULBOUS TIP W/O VENT (50EA/CA)

## (undated) DEVICE — TUBING CLEARLINK DUO-VENT - C-FLO (48EA/CA)

## (undated) DEVICE — TOWEL STOP TIMEOUT SAFETY FLAG (40EA/CA)

## (undated) DEVICE — SENSOR OXIMETER ADULT SPO2 RD SET (20EA/BX)

## (undated) DEVICE — SLEEVE VASO DVT COMPRESSION CALF MED - (10PR/CA)

## (undated) DEVICE — VESSELOOP MAXI BLUE STERILE- SURG-I-LOOP (10EA/BX)

## (undated) DEVICE — GLOVE BIOGEL PI INDICATOR SZ 6.5 SURGICAL PF LF - (50/BX 4BX/CA)

## (undated) DEVICE — GLOVE BIOGEL SZ 7 SURGICAL PF LTX - (50PR/BX 4BX/CA)

## (undated) DEVICE — JELLY SURGILUBE STERILE TUBE 4.25 OZ (1/EA)

## (undated) DEVICE — DRAPE MAGNETIC (INSTRA-MAG) - (30/CA)

## (undated) DEVICE — GOWN SURGEONS LARGE - (32/CA)

## (undated) DEVICE — SYRINGE 30 ML LL (56/BX)

## (undated) DEVICE — GLOVE BIOGEL PI INDICATOR SZ 7.5 SURGICAL PF LF -(50/BX 4BX/CA)

## (undated) DEVICE — COVER SHEATH PROBE FOR SAVI SCOUT (20EA/BX)

## (undated) DEVICE — CHLORAPREP 26 ML APPLICATOR - ORANGE TINT(25/CA)

## (undated) DEVICE — MASK OXYGEN VNYL ADLT MED CONC WITH 7 FOOT TUBING - (50EA/CA)

## (undated) DEVICE — LEGGING LITHOTOMY 31 X 48 IN - (2EA/PK 20PK/CA)

## (undated) DEVICE — CLIP INTERNAL LIGATE TITANIUM MEDIUM WECK HORIZON (6EA/PK 30PK/BX)

## (undated) DEVICE — GLOVE BIOGEL SZ 7.5 SURGICAL PF LTX - (50PR/BX 4BX/CA)

## (undated) DEVICE — SPONGE GAUZESTER 4 X 4 4PLY - (128PK/CA)

## (undated) DEVICE — SUTURE 3-0 VICRYL PLUS SH - 8X 18 INCH (12/BX)

## (undated) DEVICE — CANISTER SUCTION RIGID RED 1500CC (40EA/CA)

## (undated) DEVICE — DRAPE UNDER BUTTOCKS FLUID - (20/CA)

## (undated) DEVICE — SET EXTENSION WITH 2 PORTS (48EA/CA) ***PART #2C8610 IS A SUBSTITUTE*****

## (undated) DEVICE — ELECTRODE DUAL RETURN W/ CORD - (50/PK)

## (undated) DEVICE — SUTURE 3-0 MONOCRYL PLUS PS-2 - (12/BX)

## (undated) DEVICE — TUBE CONNECTING SUCTION - CLEAR PLASTIC STERILE 72 IN (50EA/CA)

## (undated) DEVICE — TRAY SRGPRP PVP IOD WT PRP - (20/CA)

## (undated) DEVICE — MASK AIRWAY SIZE 4 UNIQUE SILICON (10EA/BX)

## (undated) DEVICE — COVER LIGHT HANDLE ALC PLUS DISP (18EA/BX)

## (undated) DEVICE — SUTURE GENERAL

## (undated) DEVICE — LACTATED RINGERS INJ 1000 ML - (14EA/CA 60CA/PF)

## (undated) DEVICE — SYRINGE 10 ML CONTROL LL (25EA/BX 4BX/CA)

## (undated) DEVICE — PACK MINOR BASIN - (4EA/CA)

## (undated) DEVICE — GOWN WARMING STANDARD FLEX - (30/CA)

## (undated) DEVICE — MARKER CORRECT CLIPS (1EA) - MIN ORDER OF 24 EA MUST BE INCREMENTS OF 24

## (undated) DEVICE — CANISTER SUCTION 3000ML MECHANICAL FILTER AUTO SHUTOFF MEDI-VAC NONSTERILE LF DISP (40EA/CA)

## (undated) DEVICE — GLOVE BIOGEL PI INDICATOR SZ 6.0 SURGICAL PF LF -(200PR/CA)

## (undated) DEVICE — GLOVE SZ 7 BIOGEL PI MICRO - PF LF (50PR/BX 4BX/CA)

## (undated) DEVICE — PLUMEPEN ULTRA 3/8 IN X 10 FT HOSE (20EA/CA)

## (undated) DEVICE — ARMREST CRADLE FOAM - (2PR/PK 12PR/CA)

## (undated) DEVICE — SUTURE 3-0 VICRYL PLUS SH - 27 INCH (36/BX)

## (undated) DEVICE — WATER IRRIGATION STERILE 1000ML (12EA/CA)

## (undated) DEVICE — BINDER BREAST FLORAL PINK LARGE 36-40 (1EA)"

## (undated) DEVICE — SET LEADWIRE 5 LEAD BEDSIDE DISPOSABLE ECG (1SET OF 5/EA)

## (undated) DEVICE — GLOVE BIOGEL INDICATOR SZ 7.5 SURGICAL PF LTX - (50PR/BX 4BX/CA)

## (undated) DEVICE — PACK BREAST RECONSTRUCTION (2EA/CA)

## (undated) DEVICE — CHLORAPREP 3 ML APPLICATOR - (25/BX 4BX/CS 100/CS)

## (undated) DEVICE — CANNULA O2 COMFORT SOFT EAR ADULT 7 FT TUBING (50/CA)

## (undated) DEVICE — GLOVE SZ 6.5 BIOGEL PI MICRO - PF LF (50PR/BX)

## (undated) DEVICE — PEN SKIN MARKER W/RULER - (50EA/BX)

## (undated) DEVICE — KIT  I.V. START (100EA/CA)

## (undated) DEVICE — GLOVE BIOGEL SZ 6 PF LATEX - (50EA/BX 4BX/CA)

## (undated) DEVICE — SUTURE 2-0 VICRYL PLUS SH - 27 INCH (36/BX)

## (undated) DEVICE — CLOSURE SKIN STRIP 1/2 X 4 IN - (STERI STRIP) (50/BX 4BX/CA)

## (undated) DEVICE — GAUZE FLUFF STERILE 2-PLY 36 X 36 (100EA/CA)

## (undated) DEVICE — SUTURE 0 COATED VICRYL 6-18IN - (12PK/BX)

## (undated) DEVICE — SHEAR HS FOCUS 9CM CVD - (6/BX)

## (undated) DEVICE — DRAPE LAPAROTOMY T SHEET - (12EA/CA)

## (undated) DEVICE — BRIEF STRETCH MATERNITY M/L - FITS 20-60IN (5EA/BG 20BG/CA)

## (undated) DEVICE — COVER LIGHT HANDLE FLEXIBLE - SOFT (2EA/PK 80PK/CA)